# Patient Record
Sex: FEMALE | Race: WHITE | NOT HISPANIC OR LATINO
[De-identification: names, ages, dates, MRNs, and addresses within clinical notes are randomized per-mention and may not be internally consistent; named-entity substitution may affect disease eponyms.]

---

## 2017-01-11 ENCOUNTER — APPOINTMENT (OUTPATIENT)
Dept: GYNECOLOGIC ONCOLOGY | Facility: CLINIC | Age: 72
End: 2017-01-11

## 2017-02-23 ENCOUNTER — APPOINTMENT (OUTPATIENT)
Dept: GYNECOLOGIC ONCOLOGY | Facility: CLINIC | Age: 72
End: 2017-02-23

## 2017-02-23 VITALS
SYSTOLIC BLOOD PRESSURE: 130 MMHG | DIASTOLIC BLOOD PRESSURE: 80 MMHG | BODY MASS INDEX: 23.39 KG/M2 | HEIGHT: 59 IN | WEIGHT: 116 LBS

## 2017-02-23 DIAGNOSIS — Z86.018 PERSONAL HISTORY OF OTHER BENIGN NEOPLASM: ICD-10-CM

## 2017-02-23 DIAGNOSIS — M79.669 PAIN IN UNSPECIFIED LOWER LEG: ICD-10-CM

## 2017-02-23 DIAGNOSIS — N95.0 POSTMENOPAUSAL BLEEDING: ICD-10-CM

## 2017-02-23 DIAGNOSIS — Z87.898 PERSONAL HISTORY OF OTHER SPECIFIED CONDITIONS: ICD-10-CM

## 2017-02-23 DIAGNOSIS — N94.9 UNSPECIFIED CONDITION ASSOCIATED WITH FEMALE GENITAL ORGANS AND MENSTRUAL CYCLE: ICD-10-CM

## 2017-02-24 LAB — CANCER AG125 SERPL-ACNC: 8 U/ML

## 2017-05-24 ENCOUNTER — APPOINTMENT (OUTPATIENT)
Dept: GYNECOLOGIC ONCOLOGY | Facility: CLINIC | Age: 72
End: 2017-05-24

## 2017-05-24 VITALS
SYSTOLIC BLOOD PRESSURE: 140 MMHG | BODY MASS INDEX: 24.19 KG/M2 | WEIGHT: 120 LBS | DIASTOLIC BLOOD PRESSURE: 90 MMHG | HEIGHT: 59 IN

## 2017-05-26 LAB — CANCER AG125 SERPL-ACNC: 9 U/ML

## 2017-05-30 ENCOUNTER — OTHER (OUTPATIENT)
Age: 72
End: 2017-05-30

## 2017-06-28 ENCOUNTER — APPOINTMENT (OUTPATIENT)
Dept: DERMATOLOGY | Facility: CLINIC | Age: 72
End: 2017-06-28

## 2017-08-14 ENCOUNTER — OUTPATIENT (OUTPATIENT)
Dept: OUTPATIENT SERVICES | Facility: HOSPITAL | Age: 72
LOS: 1 days | Discharge: HOME | End: 2017-08-14

## 2017-08-14 DIAGNOSIS — Z96.641 PRESENCE OF RIGHT ARTIFICIAL HIP JOINT: Chronic | ICD-10-CM

## 2017-08-14 DIAGNOSIS — Z90.710 ACQUIRED ABSENCE OF BOTH CERVIX AND UTERUS: Chronic | ICD-10-CM

## 2017-08-14 DIAGNOSIS — S62.109A FRACTURE OF UNSPECIFIED CARPAL BONE, UNSPECIFIED WRIST, INITIAL ENCOUNTER FOR CLOSED FRACTURE: Chronic | ICD-10-CM

## 2017-08-14 DIAGNOSIS — S72.009A FRACTURE OF UNSPECIFIED PART OF NECK OF UNSPECIFIED FEMUR, INITIAL ENCOUNTER FOR CLOSED FRACTURE: Chronic | ICD-10-CM

## 2017-08-14 DIAGNOSIS — Z98.89 OTHER SPECIFIED POSTPROCEDURAL STATES: Chronic | ICD-10-CM

## 2017-08-14 DIAGNOSIS — C54.1 MALIGNANT NEOPLASM OF ENDOMETRIUM: ICD-10-CM

## 2017-08-16 ENCOUNTER — APPOINTMENT (OUTPATIENT)
Dept: HEMATOLOGY ONCOLOGY | Facility: CLINIC | Age: 72
End: 2017-08-16
Payer: MEDICARE

## 2017-08-16 ENCOUNTER — RESULT REVIEW (OUTPATIENT)
Age: 72
End: 2017-08-16

## 2017-08-16 VITALS
BODY MASS INDEX: 23.6 KG/M2 | HEART RATE: 83 BPM | OXYGEN SATURATION: 96 % | TEMPERATURE: 99.2 F | SYSTOLIC BLOOD PRESSURE: 131 MMHG | RESPIRATION RATE: 16 BRPM | DIASTOLIC BLOOD PRESSURE: 75 MMHG | WEIGHT: 116.84 LBS

## 2017-08-16 LAB
HCT VFR BLD CALC: 41.5 % — SIGNIFICANT CHANGE UP (ref 34.5–45)
HGB BLD-MCNC: 13.8 G/DL — SIGNIFICANT CHANGE UP (ref 11.5–15.5)
MCHC RBC-ENTMCNC: 32.2 PG — SIGNIFICANT CHANGE UP (ref 27–34)
MCHC RBC-ENTMCNC: 33.3 G/DL — SIGNIFICANT CHANGE UP (ref 32–36)
MCV RBC AUTO: 96.8 FL — SIGNIFICANT CHANGE UP (ref 80–100)
PLATELET # BLD AUTO: 209 K/UL — SIGNIFICANT CHANGE UP (ref 150–400)
RBC # BLD: 4.29 M/UL — SIGNIFICANT CHANGE UP (ref 3.8–5.2)
RBC # FLD: 13 % — SIGNIFICANT CHANGE UP (ref 10.3–14.5)
WBC # BLD: 6 K/UL — SIGNIFICANT CHANGE UP (ref 3.8–10.5)
WBC # FLD AUTO: 6 K/UL — SIGNIFICANT CHANGE UP (ref 3.8–10.5)

## 2017-08-16 PROCEDURE — 99214 OFFICE O/P EST MOD 30 MIN: CPT

## 2017-08-16 RX ORDER — VALACYCLOVIR 1 G/1
1 TABLET, FILM COATED ORAL DAILY
Refills: 0 | Status: ACTIVE | COMMUNITY
Start: 2017-08-16

## 2017-08-16 RX ORDER — DOXYCYCLINE HYCLATE 100 MG/1
100 CAPSULE ORAL DAILY
Refills: 0 | Status: ACTIVE | COMMUNITY
Start: 2017-08-16

## 2017-08-17 LAB
ALBUMIN SERPL ELPH-MCNC: 4.3 G/DL
ALP BLD-CCNC: 86 U/L
ALT SERPL-CCNC: 44 U/L
ANION GAP SERPL CALC-SCNC: 13 MMOL/L
AST SERPL-CCNC: 39 U/L
BILIRUB SERPL-MCNC: 0.4 MG/DL
BUN SERPL-MCNC: 17 MG/DL
CALCIUM SERPL-MCNC: 9.6 MG/DL
CANCER AG125 SERPL-ACNC: 9 U/ML
CHLORIDE SERPL-SCNC: 95 MMOL/L
CO2 SERPL-SCNC: 30 MMOL/L
CREAT SERPL-MCNC: 0.64 MG/DL
GLUCOSE SERPL-MCNC: 99 MG/DL
POTASSIUM SERPL-SCNC: 4.1 MMOL/L
PROT SERPL-MCNC: 6.9 G/DL
SODIUM SERPL-SCNC: 138 MMOL/L

## 2017-09-14 ENCOUNTER — APPOINTMENT (OUTPATIENT)
Dept: GYNECOLOGIC ONCOLOGY | Facility: CLINIC | Age: 72
End: 2017-09-14

## 2017-09-19 ENCOUNTER — APPOINTMENT (OUTPATIENT)
Dept: GYNECOLOGIC ONCOLOGY | Facility: CLINIC | Age: 72
End: 2017-09-19
Payer: MEDICARE

## 2017-09-19 VITALS
DIASTOLIC BLOOD PRESSURE: 78 MMHG | WEIGHT: 117 LBS | SYSTOLIC BLOOD PRESSURE: 162 MMHG | BODY MASS INDEX: 23.59 KG/M2 | HEIGHT: 59 IN

## 2017-09-19 VITALS — SYSTOLIC BLOOD PRESSURE: 127 MMHG | DIASTOLIC BLOOD PRESSURE: 73 MMHG

## 2017-09-19 DIAGNOSIS — C54.1 MALIGNANT NEOPLASM OF ENDOMETRIUM: ICD-10-CM

## 2017-09-19 PROCEDURE — 99213 OFFICE O/P EST LOW 20 MIN: CPT

## 2017-11-17 ENCOUNTER — APPOINTMENT (OUTPATIENT)
Dept: ORTHOPEDIC SURGERY | Facility: CLINIC | Age: 72
End: 2017-11-17
Payer: MEDICARE

## 2017-11-17 VITALS
WEIGHT: 117 LBS | SYSTOLIC BLOOD PRESSURE: 151 MMHG | HEART RATE: 76 BPM | BODY MASS INDEX: 23.59 KG/M2 | DIASTOLIC BLOOD PRESSURE: 86 MMHG | HEIGHT: 59 IN

## 2017-11-17 DIAGNOSIS — S52.502D UNSPECIFIED FRACTURE OF THE LOWER END OF LEFT RADIUS, SUBSEQUENT ENCOUNTER FOR CLOSED FRACTURE WITH ROUTINE HEALING: ICD-10-CM

## 2017-11-17 DIAGNOSIS — M75.111 INCOMPLETE ROTATOR CUFF TEAR OR RUPTURE OF RIGHT SHOULDER, NOT SPECIFIED AS TRAUMATIC: ICD-10-CM

## 2017-11-17 DIAGNOSIS — S72.002D FRACTURE OF UNSPECIFIED PART OF NECK OF LEFT FEMUR, SUBSEQUENT ENCOUNTER FOR CLOSED FRACTURE WITH ROUTINE HEALING: ICD-10-CM

## 2017-11-17 PROCEDURE — 99213 OFFICE O/P EST LOW 20 MIN: CPT

## 2017-11-17 PROCEDURE — 73502 X-RAY EXAM HIP UNI 2-3 VIEWS: CPT | Mod: LT

## 2018-01-25 ENCOUNTER — INPATIENT (INPATIENT)
Facility: HOSPITAL | Age: 73
LOS: 4 days | Discharge: HOME | DRG: 517 | End: 2018-01-30
Attending: INTERNAL MEDICINE | Admitting: FAMILY MEDICINE
Payer: COMMERCIAL

## 2018-01-25 VITALS
WEIGHT: 117.95 LBS | HEART RATE: 88 BPM | TEMPERATURE: 98 F | DIASTOLIC BLOOD PRESSURE: 92 MMHG | OXYGEN SATURATION: 98 % | HEIGHT: 59 IN | RESPIRATION RATE: 20 BRPM | SYSTOLIC BLOOD PRESSURE: 171 MMHG

## 2018-01-25 DIAGNOSIS — Z98.89 OTHER SPECIFIED POSTPROCEDURAL STATES: Chronic | ICD-10-CM

## 2018-01-25 DIAGNOSIS — B00.9 HERPESVIRAL INFECTION, UNSPECIFIED: ICD-10-CM

## 2018-01-25 DIAGNOSIS — Z90.710 ACQUIRED ABSENCE OF BOTH CERVIX AND UTERUS: Chronic | ICD-10-CM

## 2018-01-25 DIAGNOSIS — Z85.42 PERSONAL HISTORY OF MALIGNANT NEOPLASM OF OTHER PARTS OF UTERUS: ICD-10-CM

## 2018-01-25 DIAGNOSIS — S82.009A UNSPECIFIED FRACTURE OF UNSPECIFIED PATELLA, INITIAL ENCOUNTER FOR CLOSED FRACTURE: ICD-10-CM

## 2018-01-25 DIAGNOSIS — S72.009A FRACTURE OF UNSPECIFIED PART OF NECK OF UNSPECIFIED FEMUR, INITIAL ENCOUNTER FOR CLOSED FRACTURE: Chronic | ICD-10-CM

## 2018-01-25 DIAGNOSIS — Z96.641 PRESENCE OF RIGHT ARTIFICIAL HIP JOINT: Chronic | ICD-10-CM

## 2018-01-25 DIAGNOSIS — I10 ESSENTIAL (PRIMARY) HYPERTENSION: ICD-10-CM

## 2018-01-25 DIAGNOSIS — S62.109A FRACTURE OF UNSPECIFIED CARPAL BONE, UNSPECIFIED WRIST, INITIAL ENCOUNTER FOR CLOSED FRACTURE: Chronic | ICD-10-CM

## 2018-01-25 DIAGNOSIS — Z86.718 PERSONAL HISTORY OF OTHER VENOUS THROMBOSIS AND EMBOLISM: ICD-10-CM

## 2018-01-25 LAB
ALBUMIN SERPL ELPH-MCNC: 4.1 G/DL — SIGNIFICANT CHANGE UP (ref 3.3–5.2)
ALP SERPL-CCNC: 99 U/L — SIGNIFICANT CHANGE UP (ref 40–120)
ALT FLD-CCNC: 58 U/L — HIGH
ANION GAP SERPL CALC-SCNC: 13 MMOL/L — SIGNIFICANT CHANGE UP (ref 5–17)
APTT BLD: 27.7 SEC — SIGNIFICANT CHANGE UP (ref 27.5–37.4)
AST SERPL-CCNC: 39 U/L — HIGH
BASOPHILS # BLD AUTO: 0 K/UL — SIGNIFICANT CHANGE UP (ref 0–0.2)
BASOPHILS NFR BLD AUTO: 0.3 % — SIGNIFICANT CHANGE UP (ref 0–2)
BILIRUB SERPL-MCNC: 0.4 MG/DL — SIGNIFICANT CHANGE UP (ref 0.4–2)
BLD GP AB SCN SERPL QL: SIGNIFICANT CHANGE UP
BUN SERPL-MCNC: 13 MG/DL — SIGNIFICANT CHANGE UP (ref 8–20)
CALCIUM SERPL-MCNC: 9 MG/DL — SIGNIFICANT CHANGE UP (ref 8.6–10.2)
CHLORIDE SERPL-SCNC: 97 MMOL/L — LOW (ref 98–107)
CO2 SERPL-SCNC: 26 MMOL/L — SIGNIFICANT CHANGE UP (ref 22–29)
CREAT SERPL-MCNC: 0.41 MG/DL — LOW (ref 0.5–1.3)
EOSINOPHIL # BLD AUTO: 0.1 K/UL — SIGNIFICANT CHANGE UP (ref 0–0.5)
EOSINOPHIL NFR BLD AUTO: 1 % — SIGNIFICANT CHANGE UP (ref 0–6)
GLUCOSE SERPL-MCNC: 89 MG/DL — SIGNIFICANT CHANGE UP (ref 70–115)
HCT VFR BLD CALC: 40.5 % — SIGNIFICANT CHANGE UP (ref 37–47)
HGB BLD-MCNC: 13.6 G/DL — SIGNIFICANT CHANGE UP (ref 12–16)
INR BLD: 0.93 RATIO — SIGNIFICANT CHANGE UP (ref 0.88–1.16)
LYMPHOCYTES # BLD AUTO: 1.9 K/UL — SIGNIFICANT CHANGE UP (ref 1–4.8)
LYMPHOCYTES # BLD AUTO: 32.6 % — SIGNIFICANT CHANGE UP (ref 20–55)
MCHC RBC-ENTMCNC: 32.2 PG — HIGH (ref 27–31)
MCHC RBC-ENTMCNC: 33.6 G/DL — SIGNIFICANT CHANGE UP (ref 32–36)
MCV RBC AUTO: 95.7 FL — SIGNIFICANT CHANGE UP (ref 81–99)
MONOCYTES # BLD AUTO: 0.7 K/UL — SIGNIFICANT CHANGE UP (ref 0–0.8)
MONOCYTES NFR BLD AUTO: 11.4 % — HIGH (ref 3–10)
NEUTROPHILS # BLD AUTO: 3.2 K/UL — SIGNIFICANT CHANGE UP (ref 1.8–8)
NEUTROPHILS NFR BLD AUTO: 54.7 % — SIGNIFICANT CHANGE UP (ref 37–73)
PLATELET # BLD AUTO: 222 K/UL — SIGNIFICANT CHANGE UP (ref 150–400)
POTASSIUM SERPL-MCNC: 4 MMOL/L — SIGNIFICANT CHANGE UP (ref 3.5–5.3)
POTASSIUM SERPL-SCNC: 4 MMOL/L — SIGNIFICANT CHANGE UP (ref 3.5–5.3)
PROT SERPL-MCNC: 7.1 G/DL — SIGNIFICANT CHANGE UP (ref 6.6–8.7)
PROTHROM AB SERPL-ACNC: 10.2 SEC — SIGNIFICANT CHANGE UP (ref 9.8–12.7)
RBC # BLD: 4.23 M/UL — LOW (ref 4.4–5.2)
RBC # FLD: 12.6 % — SIGNIFICANT CHANGE UP (ref 11–15.6)
SODIUM SERPL-SCNC: 136 MMOL/L — SIGNIFICANT CHANGE UP (ref 135–145)
TROPONIN T SERPL-MCNC: <0.01 NG/ML — SIGNIFICANT CHANGE UP (ref 0–0.06)
TYPE + AB SCN PNL BLD: SIGNIFICANT CHANGE UP
WBC # BLD: 5.9 K/UL — SIGNIFICANT CHANGE UP (ref 4.8–10.8)
WBC # FLD AUTO: 5.9 K/UL — SIGNIFICANT CHANGE UP (ref 4.8–10.8)

## 2018-01-25 PROCEDURE — 73562 X-RAY EXAM OF KNEE 3: CPT | Mod: 26,RT

## 2018-01-25 PROCEDURE — 99222 1ST HOSP IP/OBS MODERATE 55: CPT

## 2018-01-25 PROCEDURE — 99285 EMERGENCY DEPT VISIT HI MDM: CPT

## 2018-01-25 PROCEDURE — 71046 X-RAY EXAM CHEST 2 VIEWS: CPT | Mod: 26

## 2018-01-25 PROCEDURE — 93010 ELECTROCARDIOGRAM REPORT: CPT

## 2018-01-25 PROCEDURE — 99223 1ST HOSP IP/OBS HIGH 75: CPT | Mod: 57

## 2018-01-25 PROCEDURE — 73502 X-RAY EXAM HIP UNI 2-3 VIEWS: CPT | Mod: 26,RT

## 2018-01-25 RX ORDER — SODIUM CHLORIDE 9 MG/ML
3 INJECTION INTRAMUSCULAR; INTRAVENOUS; SUBCUTANEOUS ONCE
Qty: 0 | Refills: 0 | Status: COMPLETED | OUTPATIENT
Start: 2018-01-25 | End: 2018-01-25

## 2018-01-25 RX ORDER — VITAMIN E 100 UNIT
400 CAPSULE ORAL DAILY
Qty: 0 | Refills: 0 | Status: DISCONTINUED | OUTPATIENT
Start: 2018-01-25 | End: 2018-01-26

## 2018-01-25 RX ORDER — LOSARTAN POTASSIUM 100 MG/1
50 TABLET, FILM COATED ORAL DAILY
Qty: 0 | Refills: 0 | Status: DISCONTINUED | OUTPATIENT
Start: 2018-01-25 | End: 2018-01-26

## 2018-01-25 RX ORDER — VALACYCLOVIR 500 MG/1
1000 TABLET, FILM COATED ORAL DAILY
Qty: 0 | Refills: 0 | Status: DISCONTINUED | OUTPATIENT
Start: 2018-01-25 | End: 2018-01-26

## 2018-01-25 RX ORDER — ASCORBIC ACID 60 MG
500 TABLET,CHEWABLE ORAL DAILY
Qty: 0 | Refills: 0 | Status: DISCONTINUED | OUTPATIENT
Start: 2018-01-25 | End: 2018-01-26

## 2018-01-25 RX ORDER — HEPARIN SODIUM 5000 [USP'U]/ML
5000 INJECTION INTRAVENOUS; SUBCUTANEOUS ONCE
Qty: 0 | Refills: 0 | Status: COMPLETED | OUTPATIENT
Start: 2018-01-25 | End: 2018-01-25

## 2018-01-25 RX ORDER — CHOLECALCIFEROL (VITAMIN D3) 125 MCG
1000 CAPSULE ORAL DAILY
Qty: 0 | Refills: 0 | Status: DISCONTINUED | OUTPATIENT
Start: 2018-01-25 | End: 2018-01-26

## 2018-01-25 RX ORDER — ACETAMINOPHEN 500 MG
650 TABLET ORAL ONCE
Qty: 0 | Refills: 0 | Status: COMPLETED | OUTPATIENT
Start: 2018-01-25 | End: 2018-01-25

## 2018-01-25 RX ORDER — OXYCODONE AND ACETAMINOPHEN 5; 325 MG/1; MG/1
1 TABLET ORAL EVERY 6 HOURS
Qty: 0 | Refills: 0 | Status: DISCONTINUED | OUTPATIENT
Start: 2018-01-25 | End: 2018-01-26

## 2018-01-25 RX ADMIN — SODIUM CHLORIDE 3 MILLILITER(S): 9 INJECTION INTRAMUSCULAR; INTRAVENOUS; SUBCUTANEOUS at 19:15

## 2018-01-25 RX ADMIN — HEPARIN SODIUM 5000 UNIT(S): 5000 INJECTION INTRAVENOUS; SUBCUTANEOUS at 22:23

## 2018-01-25 RX ADMIN — Medication 650 MILLIGRAM(S): at 16:30

## 2018-01-25 NOTE — H&P ADULT - PROBLEM SELECTOR PLAN 1
ortho has been consulted, recommendation noted,  DVT prophylaxis.  pain control  Patient will be at average risk for the proposed procedure.

## 2018-01-25 NOTE — H&P ADULT - NEGATIVE CARDIOVASCULAR SYMPTOMS
no orthopnea/no paroxysmal nocturnal dyspnea/no dyspnea on exertion/no peripheral edema/no chest pain/no palpitations

## 2018-01-25 NOTE — H&P ADULT - FAMILY HISTORY
Family history of premature CAD     Family history of hypertension in sister     Sibling  Still living? Unknown  Family history of glaucoma in sister, Age at diagnosis: Age Unknown

## 2018-01-25 NOTE — CONSULT NOTE ADULT - SUBJECTIVE AND OBJECTIVE BOX
Pt Name: DEQUAN OSORIO    MRN: [id number]      Patient is a 72y Female c/o right knee pain x today. Patient states she was at work, when she tripped and fell, impacting her right knee. Patient admits to hx of right JANEEN with Dr. Saleh a few years ago. Also admits to ORIF left wrist 2 years ago. Patient states had a DVT 2 years ago, was on Xarelto but is no longer taking. Also c/o right hip "soreness" since fall. No other orthopedic complaints.    HEALTH ISSUES - PROBLEM Dx:      .      REVIEW OF SYSTEMS      General:	denies fever/chills    Respiratory and Thorax: denies SOB  	  Cardiovascular:	denies CP    Gastrointestinal:	denies abd pain    Musculoskeletal:	 c/o right knee pain    Neurological:	denies numbness/tingling    ROS is otherwise negative.    PAST MEDICAL & SURGICAL HISTORY:  PAST MEDICAL & SURGICAL HISTORY:  Arthritis  Diverticulosis  Uterine cancer  Liver enzyme elevation  Cataracts, bilateral  DVT (deep venous thrombosis), left  Hip fracture: left  Hypertension  S/P hysterectomy  Fracture dislocation of wrist joint: ORIF Left wrist - Plate (12/2015)  S/P tonsillectomy  Hip fracture requiring operative repair: ORIF left Hip - 3 screws (11/2015)  History of hip replacement, total, right      Allergies: epinephrine (Short breath)      Medications: sodium chloride 0.9% lock flush 3 milliLiter(s) IV Push once      FAMILY HISTORY:  Family history of hypertension in sister  Family history of glaucoma in sister  Family history of premature CAD  : non-contributory    Social History:     Ambulation: Walking independently [ X] With Cane [ ] With Walker [ ]  Bedbound [ ]               PHYSICAL EXAM:    Vital Signs Last 24 Hrs  T(C): 36.7 (25 Jan 2018 14:37), Max: 36.7 (25 Jan 2018 14:37)  T(F): 98.1 (25 Jan 2018 14:37), Max: 98.1 (25 Jan 2018 14:37)  HR: 88 (25 Jan 2018 14:37) (88 - 88)  BP: 171/92 (25 Jan 2018 14:37) (171/92 - 171/92)  BP(mean): --  RR: 20 (25 Jan 2018 14:37) (20 - 20)  SpO2: 98% (25 Jan 2018 14:37) (98% - 98%)  Daily Height in cm: 149.86 (25 Jan 2018 14:37)    Daily     Appearance: Alert, responsive, in no acute distress.    Neurological: Sensation is grossly intact to light touch. 5/5 motor function of all extremities. No focal deficits or weaknesses found.    Skin: no rash on visible skin. Skin is clean, dry and intact. No bleeding. No abrasions. No ulcerations.    Vascular: 2+ distal pulses. Cap refill < 2 sec. No signs of venous insuffiency or stasis. No extremity ulcerations. No cyanosis.    Musculoskeletal:         Left Upper Extremity: can move freely without pain       Right Upper Extremity: can move freely without pain       Left Lower Extremity: can move freely without pain       Right Lower Extremity: No TTP right hip. + swelling and ecchymoses noted to right knee. Patient unable to SLR. SILT. + dorsi/plantarflexion. DP 2+. Calf soft NT B/L.    Imaging Studies:    xray right knee: + patella fx    A/P:  Pt is a  72y Female with Patient is a right patella fx    PLAN:  D/W Dr. Cuevas  * NPO after midnight  * plan for OR tomorrow pending medical clearance  * KI placed  * WBAT in KI  * admit to medicine

## 2018-01-25 NOTE — ED STATDOCS - MUSCULOSKELETAL, MLM
pain with straightening of the knee but is able to straighten the knee range of motion is not limited and there is no muscle tenderness.

## 2018-01-25 NOTE — ED STATDOCS - PMH
Arthritis    Cataracts, bilateral    Diverticulosis    DVT (deep venous thrombosis), left    Hip fracture  left  Hypertension    Liver enzyme elevation    Uterine cancer

## 2018-01-25 NOTE — H&P ADULT - ASSESSMENT
72 years old female with PMH of HTN, DVT, uterine cancer and L eye herpes retinitis presented to the ER with R knee pain s/p fall a work. Pain is constant, 7/10, non radiating and associated with swelling of the knee. 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 The child started running, she tripped and fell impacting her right knee. No Loc. 72 years old female with PMH of HTN, DVT, uterine cancer and herpes infection of L eye presented to the ER with R knee pain s/p fall a work. Pain is constant, 7/10, non radiating and associated with swelling of the knee. 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 The child started running, she tripped and fell impacting her right knee. No Loc.

## 2018-01-25 NOTE — ED ADULT NURSE NOTE - OBJECTIVE STATEMENT
Pt axox3  c/o right knee pain and now is unable to bear weight on affected extremity. Pt states she was walking when she tripped while walking a child landing on her right knee.  No obvious signs of trauma noted, site is tender to touch.

## 2018-01-25 NOTE — ED ADULT NURSE REASSESSMENT NOTE - COMFORT CARE
ambulated to bathroom/plan of care explained/warm blanket provided/wait time explained/meal provided/repositioned/po fluids offered

## 2018-01-25 NOTE — H&P ADULT - PROBLEM SELECTOR PLAN 2
controlled   on Irbesartan 150 mg -->cozaar 25 mg daily  monitor BP. controlled   on Irbesartan 150 mg -->cozaar 50 mg daily  monitor BP.

## 2018-01-25 NOTE — H&P ADULT - RS GEN PE MLT RESP DETAILS PC
no rhonchi/respirations non-labored/no rales/breath sounds equal/no chest wall tenderness/good air movement

## 2018-01-25 NOTE — ED ADULT TRIAGE NOTE - WEIGHT IN KG
Patient fractured her left Tib Fib in April and had ORIF done. Since then she has been working at home with PT, walking with a cane now. Therapist and home health nurse are concerned about the persistent pain and skin discoloration and inflammation noted around left ankle. Patient feels like there is something inside her leg rubbing and causing pain, pain is worse when she walks.   Currently when seated no pain. She has not been using ice, so I recommended she use ice for inflammation. Will check xray.    53.5

## 2018-01-25 NOTE — ED STATDOCS - OBJECTIVE STATEMENT
71 y/o F pt with hx of HTN, hip fracture and DVT presents to ED c/o R knee pain s/p trip and fall. 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 Pt is having difficulty ambulating. No further complaints at this time.

## 2018-01-25 NOTE — H&P ADULT - HISTORY OF PRESENT ILLNESS
72 years old female with PMH of HTN, DVT, uterine cancer and L eye herpes retinitis presented to the ER with R knee pain s/p fall a work. Pain is constant, 7/10, non radiating and associated with swelling of the knee. 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 The child started running, she tripped and fell impacting her right knee. No Loc. 72 years old female with PMH of HTN, DVT, uterine cancer and herpes infection of the L eye  presented to the ER with R knee pain s/p fall a work. Pain is constant, 7/10, non radiating and associated with swelling of the knee. 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 The child started running, she tripped and fell impacting her right knee. No Loc.

## 2018-01-25 NOTE — ED ADULT NURSE REASSESSMENT NOTE - NS ED NURSE REASSESS COMMENT FT1
PT axox3 comfortable at this time, offered and refused pain medications. Pt is to be admitted for Right  knee patella FX. Ortho at bedside speaking to patient and updated on plan of care. PT is to go to the OR in the AM ( time not known), pt agrees with plan and understands she is to be NPO at midnight. Report given to HR RN that is resuming care. Pt settled into B8R, admitting hospitalist at bedside speaking to patient

## 2018-01-25 NOTE — ED STATDOCS - PSH
Fracture dislocation of wrist joint  ORIF Left wrist - Plate (12/2015)  Hip fracture requiring operative repair  ORIF left Hip - 3 screws (11/2015)  History of hip replacement, total, right    S/P hysterectomy    S/P tonsillectomy

## 2018-01-26 ENCOUNTER — TRANSCRIPTION ENCOUNTER (OUTPATIENT)
Age: 73
End: 2018-01-26

## 2018-01-26 PROCEDURE — 99233 SBSQ HOSP IP/OBS HIGH 50: CPT

## 2018-01-26 PROCEDURE — 27524 TREAT KNEECAP FRACTURE: CPT | Mod: AS,RT

## 2018-01-26 PROCEDURE — 27524 TREAT KNEECAP FRACTURE: CPT | Mod: RT

## 2018-01-26 PROCEDURE — 76001: CPT | Mod: 26

## 2018-01-26 RX ORDER — INFLUENZA VIRUS VACCINE 15; 15; 15; 15 UG/.5ML; UG/.5ML; UG/.5ML; UG/.5ML
0.5 SUSPENSION INTRAMUSCULAR ONCE
Qty: 0 | Refills: 0 | Status: COMPLETED | OUTPATIENT
Start: 2018-01-26 | End: 2018-01-26

## 2018-01-26 RX ORDER — CEFAZOLIN SODIUM 1 G
2000 VIAL (EA) INJECTION
Qty: 0 | Refills: 0 | Status: COMPLETED | OUTPATIENT
Start: 2018-01-26 | End: 2018-01-27

## 2018-01-26 RX ORDER — CEFAZOLIN SODIUM 1 G
2000 VIAL (EA) INJECTION ONCE
Qty: 0 | Refills: 0 | Status: DISCONTINUED | OUTPATIENT
Start: 2018-01-26 | End: 2018-01-26

## 2018-01-26 RX ORDER — ONDANSETRON 8 MG/1
4 TABLET, FILM COATED ORAL ONCE
Qty: 0 | Refills: 0 | Status: DISCONTINUED | OUTPATIENT
Start: 2018-01-26 | End: 2018-01-26

## 2018-01-26 RX ORDER — OXYCODONE HYDROCHLORIDE 5 MG/1
5 TABLET ORAL
Qty: 0 | Refills: 0 | Status: DISCONTINUED | OUTPATIENT
Start: 2018-01-26 | End: 2018-01-30

## 2018-01-26 RX ORDER — ONDANSETRON 8 MG/1
4 TABLET, FILM COATED ORAL EVERY 6 HOURS
Qty: 0 | Refills: 0 | Status: DISCONTINUED | OUTPATIENT
Start: 2018-01-26 | End: 2018-01-30

## 2018-01-26 RX ORDER — ACETAMINOPHEN 500 MG
1000 TABLET ORAL ONCE
Qty: 0 | Refills: 0 | Status: DISCONTINUED | OUTPATIENT
Start: 2018-01-26 | End: 2018-01-30

## 2018-01-26 RX ORDER — OXYCODONE HYDROCHLORIDE 5 MG/1
10 TABLET ORAL EVERY 6 HOURS
Qty: 0 | Refills: 0 | Status: DISCONTINUED | OUTPATIENT
Start: 2018-01-26 | End: 2018-01-30

## 2018-01-26 RX ORDER — ACETAMINOPHEN 500 MG
975 TABLET ORAL EVERY 8 HOURS
Qty: 0 | Refills: 0 | Status: DISCONTINUED | OUTPATIENT
Start: 2018-01-26 | End: 2018-01-27

## 2018-01-26 RX ORDER — ASCORBIC ACID 60 MG
500 TABLET,CHEWABLE ORAL DAILY
Qty: 0 | Refills: 0 | Status: DISCONTINUED | OUTPATIENT
Start: 2018-01-26 | End: 2018-01-30

## 2018-01-26 RX ORDER — ENOXAPARIN SODIUM 100 MG/ML
40 INJECTION SUBCUTANEOUS DAILY
Qty: 0 | Refills: 0 | Status: DISCONTINUED | OUTPATIENT
Start: 2018-01-26 | End: 2018-01-30

## 2018-01-26 RX ORDER — VALACYCLOVIR 500 MG/1
1000 TABLET, FILM COATED ORAL DAILY
Qty: 0 | Refills: 0 | Status: DISCONTINUED | OUTPATIENT
Start: 2018-01-26 | End: 2018-01-30

## 2018-01-26 RX ORDER — LOSARTAN POTASSIUM 100 MG/1
50 TABLET, FILM COATED ORAL DAILY
Qty: 0 | Refills: 0 | Status: DISCONTINUED | OUTPATIENT
Start: 2018-01-26 | End: 2018-01-30

## 2018-01-26 RX ORDER — SODIUM CHLORIDE 9 MG/ML
1000 INJECTION, SOLUTION INTRAVENOUS
Qty: 0 | Refills: 0 | Status: DISCONTINUED | OUTPATIENT
Start: 2018-01-26 | End: 2018-01-26

## 2018-01-26 RX ORDER — LOSARTAN POTASSIUM 100 MG/1
50 TABLET, FILM COATED ORAL DAILY
Qty: 0 | Refills: 0 | Status: DISCONTINUED | OUTPATIENT
Start: 2018-01-26 | End: 2018-01-26

## 2018-01-26 RX ORDER — FENTANYL CITRATE 50 UG/ML
50 INJECTION INTRAVENOUS
Qty: 0 | Refills: 0 | Status: DISCONTINUED | OUTPATIENT
Start: 2018-01-26 | End: 2018-01-26

## 2018-01-26 RX ORDER — VITAMIN E 100 UNIT
400 CAPSULE ORAL DAILY
Qty: 0 | Refills: 0 | Status: DISCONTINUED | OUTPATIENT
Start: 2018-01-26 | End: 2018-01-30

## 2018-01-26 RX ORDER — KETOROLAC TROMETHAMINE 30 MG/ML
15 SYRINGE (ML) INJECTION EVERY 6 HOURS
Qty: 0 | Refills: 0 | Status: DISCONTINUED | OUTPATIENT
Start: 2018-01-26 | End: 2018-01-26

## 2018-01-26 RX ORDER — CHOLECALCIFEROL (VITAMIN D3) 125 MCG
1000 CAPSULE ORAL DAILY
Qty: 0 | Refills: 0 | Status: DISCONTINUED | OUTPATIENT
Start: 2018-01-26 | End: 2018-01-30

## 2018-01-26 RX ORDER — HYDROMORPHONE HYDROCHLORIDE 2 MG/ML
0.5 INJECTION INTRAMUSCULAR; INTRAVENOUS; SUBCUTANEOUS
Qty: 0 | Refills: 0 | Status: DISCONTINUED | OUTPATIENT
Start: 2018-01-26 | End: 2018-01-26

## 2018-01-26 RX ADMIN — Medication 15 MILLIGRAM(S): at 16:51

## 2018-01-26 RX ADMIN — FENTANYL CITRATE 50 MICROGRAM(S): 50 INJECTION INTRAVENOUS at 10:06

## 2018-01-26 RX ADMIN — ENOXAPARIN SODIUM 40 MILLIGRAM(S): 100 INJECTION SUBCUTANEOUS at 12:34

## 2018-01-26 RX ADMIN — Medication 975 MILLIGRAM(S): at 21:07

## 2018-01-26 RX ADMIN — Medication 15 MILLIGRAM(S): at 12:34

## 2018-01-26 RX ADMIN — Medication 1 TABLET(S): at 12:37

## 2018-01-26 RX ADMIN — Medication 975 MILLIGRAM(S): at 23:30

## 2018-01-26 RX ADMIN — Medication 15 MILLIGRAM(S): at 17:15

## 2018-01-26 RX ADMIN — Medication 100 MILLIGRAM(S): at 07:38

## 2018-01-26 RX ADMIN — Medication 975 MILLIGRAM(S): at 12:35

## 2018-01-26 RX ADMIN — Medication 975 MILLIGRAM(S): at 13:15

## 2018-01-26 RX ADMIN — FENTANYL CITRATE 50 MICROGRAM(S): 50 INJECTION INTRAVENOUS at 10:37

## 2018-01-26 RX ADMIN — Medication 400 INTERNATIONAL UNIT(S): at 16:50

## 2018-01-26 RX ADMIN — VALACYCLOVIR 1000 MILLIGRAM(S): 500 TABLET, FILM COATED ORAL at 16:50

## 2018-01-26 RX ADMIN — Medication 100 MILLIGRAM(S): at 18:00

## 2018-01-26 RX ADMIN — Medication 500 MILLIGRAM(S): at 12:34

## 2018-01-26 RX ADMIN — LOSARTAN POTASSIUM 50 MILLIGRAM(S): 100 TABLET, FILM COATED ORAL at 05:19

## 2018-01-26 RX ADMIN — FENTANYL CITRATE 50 MICROGRAM(S): 50 INJECTION INTRAVENOUS at 10:12

## 2018-01-26 RX ADMIN — Medication 15 MILLIGRAM(S): at 12:50

## 2018-01-26 RX ADMIN — Medication 50 MILLIGRAM(S): at 05:19

## 2018-01-26 RX ADMIN — Medication 1000 UNIT(S): at 16:50

## 2018-01-26 NOTE — DISCHARGE NOTE ADULT - MEDICATION SUMMARY - MEDICATIONS TO TAKE
I will START or STAY ON the medications listed below when I get home from the hospital:    vitamin D  -- 2400 international unit(s) by mouth once a day in am  -- Indication: For Home medication    calcium 1200mg & Magnesium 1200mg  -- 1 cap(s) by mouth 2 times a day  -- Indication: For Home medication    Rolling walker   -- ICD 10- S82.009A    -- Indication: For Patella fracture    acetaminophen 325 mg oral tablet  -- 2 tab(s) by mouth every 6 hours, As needed, fever  -- Indication: For Patella fracture    aspirin 325 mg oral delayed release tablet  -- 1 tab(s) by mouth 2 times a day   -- Swallow whole.  Do not crush.  Take with food or milk.    -- Indication: For Patella fracture - dvt prophylaxis for 6 week per ortho     irbesartan 150 mg oral tablet  -- 1 tab(s) by mouth once a day in am  -- Indication: For Htn    valACYclovir 1 g oral tablet  -- 1 tab(s) by mouth once a day  -- Indication: For Herpes infection    Probiotic Formula oral capsule  -- 1 cap(s) by mouth once a day in am  -- Indication: For Home medication    Protonix 40 mg oral delayed release tablet  -- 1 tab(s) by mouth once a day   -- It is very important that you take or use this exactly as directed.  Do not skip doses or discontinue unless directed by your doctor.  Obtain medical advice before taking any non-prescription drugs as some may affect the action of this medication.  Swallow whole.  Do not crush.    -- Indication: For Prophylaxis    multivitamin  -- 1 tab(s) by mouth once a day in am last dose 2/26/16  -- Indication: For Home medication    Vitamin C 500 mg oral capsule  -- 1 cap(s) by mouth once a day in am  -- Indication: For Home medication    vitamin E 400 intl units oral capsule  -- 1 cap(s) by mouth once a day in am last dose 2/26/16  -- Indication: For Home medication I will START or STAY ON the medications listed below when I get home from the hospital:    vitamin D  -- 2400 international unit(s) by mouth once a day in am  -- Indication: For supplement    calcium 1200mg & Magnesium 1200mg  -- 1 cap(s) by mouth 2 times a day  -- Indication: For supplement    Rolling walker   -- ICD 10- S82.009A    -- Indication: For Closed fracture of patella    acetaminophen 325 mg oral tablet  -- 2 tab(s) by mouth every 6 hours, As needed, fever  -- Indication: For Pain    aspirin 325 mg oral delayed release tablet  -- 1 tab(s) by mouth 2 times a day   -- Swallow whole.  Do not crush.  Take with food or milk.    -- Indication: For History of DVT (deep vein thrombosis)    irbesartan 150 mg oral tablet  -- 1 tab(s) by mouth once a day in am  -- Indication: For HTN    valACYclovir 1 g oral tablet  -- 1 tab(s) by mouth once a day  -- Indication: For Herpes infection    Probiotic Formula oral capsule  -- 1 cap(s) by mouth once a day in am  -- Indication: For Probiotic    Protonix 40 mg oral delayed release tablet  -- 1 tab(s) by mouth once a day   -- It is very important that you take or use this exactly as directed.  Do not skip doses or discontinue unless directed by your doctor.  Obtain medical advice before taking any non-prescription drugs as some may affect the action of this medication.  Swallow whole.  Do not crush.    -- Indication: For GI prophylaxis    multivitamin  -- 1 tab(s) by mouth once a day in am last dose 2/26/16  -- Indication: For supplement    Vitamin C 500 mg oral capsule  -- 1 cap(s) by mouth once a day in am  -- Indication: For supplement    vitamin E 400 intl units oral capsule  -- 1 cap(s) by mouth once a day in am last dose 2/26/16  -- Indication: For supplement

## 2018-01-26 NOTE — PROGRESS NOTE ADULT - SUBJECTIVE AND OBJECTIVE BOX
Ortho Post Op Check    Name: DEQUAN OSORIO    MR #: [id number]    Procedure: Right Patella ORIF  Surgeon:  Faith    Pt comfortable without complaints, pain controlled  Denies CP, SOB, N/V, numbness/tingling     General Exam:  Vital Signs Last 24 Hrs  T(C): 37.5 (01-26-18 @ 16:34), Max: 37.5 (01-26-18 @ 16:34)  T(F): 99.5 (01-26-18 @ 16:34), Max: 99.5 (01-26-18 @ 16:34)  HR: 91 (01-26-18 @ 16:34) (91 - 91)  BP: 137/79 (01-26-18 @ 16:34) (137/79 - 137/79)  BP(mean): --  RR: 18 (01-26-18 @ 16:34) (18 - 18)  SpO2: --    General: Pt Alert and oriented, NAD, controlled pain.  Dressings C/D/I. No bleeding.  Pulses: 2+ dorsalis pedis pulse. Cap refill < 2 sec.  Sensation: Grossly intact to light touch without deficit.  Motor: + EHL/FHL/TA/GS  Knee immobilizer in place                        13.6   5.9   )-----------( 222      ( 25 Jan 2018 18:53 )             40.5   25 Jan 2018 18:53    136    |  97     |  13.0   ----------------------------<  89     4.0     |  26.0   |  0.41       TPro  7.1    /  Alb  4.1    /  TBili  0.4    /  DBili  x      /  AST  39     /  ALT  58     /  AlkPhos  99     25 Jan 2018 18:53      Post-op X-Ray:    A/P: 72yFemale POD#0 s/p Right patellar ORIF  - Stable  - Pain Control  - DVT ppx: as per medicine  - Post op abx:  - PT eval pending  - Weight bearing status: TTWB RLE in knee immobilizer

## 2018-01-26 NOTE — DISCHARGE NOTE ADULT - PROVIDER TOKENS
AWA:'[id number]' TOKEN:'[id number]',FREE:[LAST:[primary care],PHONE:[(   )    -],FAX:[(   )    -]]

## 2018-01-26 NOTE — DISCHARGE NOTE ADULT - ADDITIONAL INSTRUCTIONS
The patient will be seen in the office between 2-3 weeks for  wound check. Tape will be removed at that time. Patient may NOT shower until after re-evaluation in the office. The patient will continue with knee immobilizer when ambulating as applied in hospital and may only remove while in bed until re-evaluation in the office. No flexion of the knee. The patient will contact the office if the wound becomes red, has increasing pain, develops bleeding or discharge, an injury occurs, or has other concerns. The patient will begin ASPIRIN 325 BID for DVTP. The patient will take Oxycodone and Tylenol for pain control and titrate according to prescription and patient needs. The patient is toe touch-weight bearing in knee immobilizer on the right lower extremity. The patient is recommended to elevated the affected extremity to reduce swelling.

## 2018-01-26 NOTE — PROGRESS NOTE ADULT - ASSESSMENT
72 years old female with PMH of HTN, DVT, uterine cancer and herpes infection of L eye presented to the ER with R knee pain s/p fall a work, xray Knee joint Acute comminuted fracture of the patella, seen by orthopedic, s/p Patella fracture surgery - comminuted inferior pole right patella fracture 01/26/2018

## 2018-01-26 NOTE — BRIEF OPERATIVE NOTE - PRE-OP DX
Closed displaced comminuted fracture of right patella, initial encounter  01/26/2018    Active  Gray Cuevas

## 2018-01-26 NOTE — PROGRESS NOTE ADULT - SUBJECTIVE AND OBJECTIVE BOX
Internal Medicine Hospitalist - Dr. Claudy OSORIO    [id number]    72y      Female    Patient is a 72y old  Female who presents with a chief complaint of R knee pain (26 Jan 2018 10:40)    INTERVAL HPI/ OVERNIGHT EVENTS: Patient is seen and examined, pt is s/p OR today, denied chest pain, SOB.     REVIEW OF SYSTEMS:    Denied fever, chills, abd. pain, nausea, vomiting, chest pain, SOB, headache, dizziness    PHYSICAL EXAM:    Vital Signs Last 24 Hrs  T(C): 37 (26 Jan 2018 12:27), Max: 37 (26 Jan 2018 05:21)  T(F): 98.6 (26 Jan 2018 12:27), Max: 98.6 (26 Jan 2018 05:21)  HR: 80 (26 Jan 2018 12:27) (74 - 98)  BP: 155/84 (26 Jan 2018 12:27) (132/65 - 171/92)  BP(mean): 103 (25 Jan 2018 22:05) (103 - 103)  RR: 18 (26 Jan 2018 12:27) (13 - 20)  SpO2: 98% (26 Jan 2018 12:27) (96% - 100%)    GENERAL: NAD  HEENT: EOMI  Neck: supple  CHEST/LUNG: CTA b/l   HEART: S1S2+ audible  ABDOMEN: Soft, Nontender, Nondistended; Bowel sounds present  EXTREMITIES:  RLE dressing intact   CNS: AAO X 3  Psychiatry: normal mood    LABS:                        13.6   5.9   )-----------( 222      ( 25 Jan 2018 18:53 )             40.5     01-25    136  |  97<L>  |  13.0  ----------------------------<  89  4.0   |  26.0  |  0.41<L>    Ca    9.0      25 Jan 2018 18:53    TPro  7.1  /  Alb  4.1  /  TBili  0.4  /  DBili  x   /  AST  39<H>  /  ALT  58<H>  /  AlkPhos  99  01-25    PT/INR - ( 25 Jan 2018 18:53 )   PT: 10.2 sec;   INR: 0.93 ratio         PTT - ( 25 Jan 2018 18:53 )  PTT:27.7 sec        MEDICATIONS  (STANDING):  acetaminophen   Tablet. 975 milliGRAM(s) Oral every 8 hours  acetaminophen  IVPB. 1000 milliGRAM(s) IV Intermittent once  ascorbic acid 500 milliGRAM(s) Oral daily  ceFAZolin   IVPB 2000 milliGRAM(s) IV Intermittent <User Schedule>  cholecalciferol 1000 Unit(s) Oral daily  enoxaparin Injectable 40 milliGRAM(s) SubCutaneous daily  ketorolac   Injectable 15 milliGRAM(s) IV Push every 6 hours  losartan 50 milliGRAM(s) Oral daily  multivitamin 1 Tablet(s) Oral daily  valACYclovir 1000 milliGRAM(s) Oral daily  vitamin E 400 International Unit(s) Oral daily    MEDICATIONS  (PRN):  ondansetron Injectable 4 milliGRAM(s) IV Push every 6 hours PRN Nausea and/or Vomiting  oxyCODONE    IR 5 milliGRAM(s) Oral every 3 hours PRN Mild Pain (1 - 3)  oxyCODONE    IR 10 milliGRAM(s) Oral every 6 hours PRN Severe Pain (7 - 10)      RADIOLOGY & ADDITIONAL TEST    < from: Xray Knee 3 Views, Right (01.25.18 @ 16:34) >  Impression:  Acute comminuted fracture of the patella..    < end of copied text >

## 2018-01-26 NOTE — DISCHARGE NOTE ADULT - CARE PROVIDER_API CALL
Gray Cuevas), Orthopaedic Surgery  [address] [place] [address]  Phone: [phone]  Fax: [phone] Gray Cuevas), Orthopaedic Surgery  [address] [place] [address]  Phone: [phone]  Fax: [phone]    primary care,   Phone: (   )    -  Fax: (   )    -

## 2018-01-26 NOTE — BRIEF OPERATIVE NOTE - PROCEDURE
<<-----Click on this checkbox to enter Procedure Patella fracture surgery  01/26/2018  comminuted inferior pole right patella fracture  Active  MLINN

## 2018-01-26 NOTE — DISCHARGE NOTE ADULT - HOSPITAL COURSE
72 years old female with PMH of HTN, DVT, uterine cancer and herpes infection of L eye presented to the ER with R knee pain s/p fall a work, xray Knee joint Acute comminuted fracture of the patella, seen by orthopedic, s/p Patella fracture surgery - comminuted inferior pole right patella fracture 01/26/2018. Pt has h/o DVT, doppler LE No evidence of bilateral lower extremity deep venous thrombosis. Pt spike low garde fever 100.5 on 01/27/2018, no focal symptoms, afebrile now, urine c/s negative, blood c/s negative so far. Pt report pain is minimal, denied fever, chills, chest pain, SOB.      Problem/Plan - 1:  ·  Problem: Patella fracture.  Plan: appreciate ortho input, s/p Patella fracture surgery  Per Ortho - DC home on  BID x 6 weeks   - Weight bearing status: TTWB RLE in knee immobilizer  - follow up with Dr. Cuevas.  Pt eval appreciated,. home with home care   doppler LE negative for DVT.      Problem/Plan - 2:  ·  Problem: R/O Fever.  Plan: low grade fever on 01/27/2018, afebrile for almost 48 hours, no focal symptoms, likely postoperative   urine c/w negative and blood c/s negative so far      Problem/Plan - 3:  ·  Problem: Hypertension.  Plan: c/w cozar 50mg, monitor BP     Problem/Plan - 4:  ·  Problem: Herpes infection.  Plan: Continue valtrex.     ICU Vital Signs Last 24 Hrs  T(C): 36.7 (29 Jan 2018 04:25), Max: 37.6 (28 Jan 2018 16:02)  T(F): 98 (29 Jan 2018 04:25), Max: 99.7 (28 Jan 2018 16:02)  HR: 100 (29 Jan 2018 04:25) (100 - 102)  BP: 160/86 (29 Jan 2018 04:25) (148/74 - 160/86)  RR: 18 (29 Jan 2018 04:25) (18 - 18)  SpO2: 97% (29 Jan 2018 04:25) (96% - 97%)    PHYSICAL EXAM:    GENERAL: NAD  CHEST/LUNG: Clear to percussion bilaterally; No rales, rhonchi, wheezing, or rubs  HEART: s1/s2 audible   ABDOMEN: Soft, Nontender, Nondistended; Bowel sounds present  EXTREMITIES: right Knee immobilizer in place            Time spent 40 minutes 72 years old female with PMH of HTN, DVT, uterine cancer and herpes infection of L eye presented to the ER with R knee pain s/p fall a work, xray Knee joint Acute comminuted fracture of the patella, seen by orthopedic, s/p Patella fracture surgery - comminuted inferior pole right patella fracture 01/26/2018. Pt has h/o DVT, doppler LE No evidence of bilateral lower extremity deep venous thrombosis. Pt spike low garde fever 100.5 on 01/27/2018, no focal symptoms, afebrile now, urine c/s negative, blood c/s negative so far. Pt report pain is minimal, denied fever, chills, chest pain, SOB. pt was discharged yesterday, in afternoon noted to have epistaxis, resolved with pressure, had another episode in evening again - resolved, discharge was held, no episode overnight, feeling better     Problem/Plan - 1:  ·  Problem: Patella fracture.  Plan: appreciate ortho input, s/p Patella fracture surgery  Per Ortho - DC home on  BID x 6 weeks   - Weight bearing status: TTWB RLE in knee immobilizer  - follow up with Dr. Cuevas.  Pt eval appreciated,. home with home care   doppler LE negative for DVT.      Problem/Plan - 2:  ·  Problem: R/O Fever.  Plan: low grade fever on 01/27/2018, afebrile for almost 48 hours, no focal symptoms, likely postoperative   urine c/s negative and blood c/s negative so far     Epistaxis.  Plan: resolved now, room temp is warm and dry - resolved.   if reoccur, f/u ENT     PHYSICAL EXAM:    GENERAL: NAD  CHEST/LUNG: Clear to percussion bilaterally; No rales, rhonchi, wheezing, or rubs  HEART: s1/s2 audible   ABDOMEN: Soft, Nontender, Nondistended; Bowel sounds present  EXTREMITIES: right Knee immobilizer in place            Time spent 40 minutes

## 2018-01-26 NOTE — DISCHARGE NOTE ADULT - PLAN OF CARE
s/p Right patellar ORIF Per Ortho - DC home on  BID x 6 weeks   - Weight bearing status: TTWB RLE in knee immobilizer  - follow up with Dr. Cuevas c/w home medication repeat doppler negative for DVT resolved, if reoccur f/u ENT

## 2018-01-26 NOTE — BRIEF OPERATIVE NOTE - COMMENTS
post-op plan: ttwb in immobilizer RLE, elevate RLE, PT/OT, ancef per SCIP,  LMWH while in house, ASA for discharge, f/u ortho 2-3 weeks to start rehab protocol

## 2018-01-26 NOTE — DISCHARGE NOTE ADULT - CARE PLAN
Principal Discharge DX:	Patella fracture  Goal:	s/p Right patellar ORIF  Assessment and plan of treatment:	Per Ortho - DC home on  BID x 6 weeks   - Weight bearing status: TTWB RLE in knee immobilizer  - follow up with Dr. Cuevas  Secondary Diagnosis:	Hypertension  Assessment and plan of treatment:	c/w home medication  Secondary Diagnosis:	History of uterine cancer  Secondary Diagnosis:	History of DVT (deep vein thrombosis)  Assessment and plan of treatment:	repeat doppler negative for DVT Principal Discharge DX:	Patella fracture  Goal:	s/p Right patellar ORIF  Assessment and plan of treatment:	Per Ortho - DC home on  BID x 6 weeks   - Weight bearing status: TTWB RLE in knee immobilizer  - follow up with Dr. Cuevas  Secondary Diagnosis:	Hypertension  Assessment and plan of treatment:	c/w home medication  Secondary Diagnosis:	History of uterine cancer  Secondary Diagnosis:	History of DVT (deep vein thrombosis)  Assessment and plan of treatment:	repeat doppler negative for DVT  Secondary Diagnosis:	Epistaxis  Assessment and plan of treatment:	resolved, if reoccur f/u ENT

## 2018-01-27 LAB
ANION GAP SERPL CALC-SCNC: 13 MMOL/L — SIGNIFICANT CHANGE UP (ref 5–17)
APPEARANCE UR: CLEAR — SIGNIFICANT CHANGE UP
BILIRUB UR-MCNC: NEGATIVE — SIGNIFICANT CHANGE UP
BUN SERPL-MCNC: 7 MG/DL — LOW (ref 8–20)
CALCIUM SERPL-MCNC: 8.6 MG/DL — SIGNIFICANT CHANGE UP (ref 8.6–10.2)
CHLORIDE SERPL-SCNC: 99 MMOL/L — SIGNIFICANT CHANGE UP (ref 98–107)
CO2 SERPL-SCNC: 26 MMOL/L — SIGNIFICANT CHANGE UP (ref 22–29)
COLOR SPEC: YELLOW — SIGNIFICANT CHANGE UP
CREAT SERPL-MCNC: 0.46 MG/DL — LOW (ref 0.5–1.3)
DIFF PNL FLD: ABNORMAL
EPI CELLS # UR: SIGNIFICANT CHANGE UP
GLUCOSE SERPL-MCNC: 122 MG/DL — HIGH (ref 70–115)
GLUCOSE UR QL: NEGATIVE MG/DL — SIGNIFICANT CHANGE UP
HCT VFR BLD CALC: 40.1 % — SIGNIFICANT CHANGE UP (ref 37–47)
HGB BLD-MCNC: 13 G/DL — SIGNIFICANT CHANGE UP (ref 12–16)
KETONES UR-MCNC: NEGATIVE — SIGNIFICANT CHANGE UP
LEUKOCYTE ESTERASE UR-ACNC: ABNORMAL
MCHC RBC-ENTMCNC: 31.4 PG — HIGH (ref 27–31)
MCHC RBC-ENTMCNC: 32.4 G/DL — SIGNIFICANT CHANGE UP (ref 32–36)
MCV RBC AUTO: 96.9 FL — SIGNIFICANT CHANGE UP (ref 81–99)
NITRITE UR-MCNC: NEGATIVE — SIGNIFICANT CHANGE UP
PH UR: 7 — SIGNIFICANT CHANGE UP (ref 5–8)
PLATELET # BLD AUTO: 199 K/UL — SIGNIFICANT CHANGE UP (ref 150–400)
POTASSIUM SERPL-MCNC: 3.8 MMOL/L — SIGNIFICANT CHANGE UP (ref 3.5–5.3)
POTASSIUM SERPL-SCNC: 3.8 MMOL/L — SIGNIFICANT CHANGE UP (ref 3.5–5.3)
PROT UR-MCNC: NEGATIVE MG/DL — SIGNIFICANT CHANGE UP
RBC # BLD: 4.14 M/UL — LOW (ref 4.4–5.2)
RBC # FLD: 12.8 % — SIGNIFICANT CHANGE UP (ref 11–15.6)
RBC CASTS # UR COMP ASSIST: SIGNIFICANT CHANGE UP /HPF (ref 0–4)
SODIUM SERPL-SCNC: 138 MMOL/L — SIGNIFICANT CHANGE UP (ref 135–145)
SP GR SPEC: 1 — LOW (ref 1.01–1.02)
UROBILINOGEN FLD QL: NEGATIVE MG/DL — SIGNIFICANT CHANGE UP
WBC # BLD: 12.5 K/UL — HIGH (ref 4.8–10.8)
WBC # FLD AUTO: 12.5 K/UL — HIGH (ref 4.8–10.8)
WBC UR QL: SIGNIFICANT CHANGE UP

## 2018-01-27 PROCEDURE — 93970 EXTREMITY STUDY: CPT | Mod: 26

## 2018-01-27 PROCEDURE — 99233 SBSQ HOSP IP/OBS HIGH 50: CPT

## 2018-01-27 RX ORDER — KETOROLAC TROMETHAMINE 30 MG/ML
15 SYRINGE (ML) INJECTION EVERY 6 HOURS
Qty: 0 | Refills: 0 | Status: DISCONTINUED | OUTPATIENT
Start: 2018-01-27 | End: 2018-01-29

## 2018-01-27 RX ORDER — ACETAMINOPHEN 500 MG
650 TABLET ORAL EVERY 6 HOURS
Qty: 0 | Refills: 0 | Status: DISCONTINUED | OUTPATIENT
Start: 2018-01-27 | End: 2018-01-30

## 2018-01-27 RX ORDER — HYDRALAZINE HCL 50 MG
25 TABLET ORAL EVERY 6 HOURS
Qty: 0 | Refills: 0 | Status: DISCONTINUED | OUTPATIENT
Start: 2018-01-27 | End: 2018-01-30

## 2018-01-27 RX ADMIN — VALACYCLOVIR 1000 MILLIGRAM(S): 500 TABLET, FILM COATED ORAL at 13:59

## 2018-01-27 RX ADMIN — Medication 25 MILLIGRAM(S): at 11:58

## 2018-01-27 RX ADMIN — Medication 1 TABLET(S): at 14:01

## 2018-01-27 RX ADMIN — Medication 1000 UNIT(S): at 13:59

## 2018-01-27 RX ADMIN — Medication 975 MILLIGRAM(S): at 13:58

## 2018-01-27 RX ADMIN — Medication 400 INTERNATIONAL UNIT(S): at 13:59

## 2018-01-27 RX ADMIN — Medication 650 MILLIGRAM(S): at 18:05

## 2018-01-27 RX ADMIN — Medication 500 MILLIGRAM(S): at 12:10

## 2018-01-27 RX ADMIN — ENOXAPARIN SODIUM 40 MILLIGRAM(S): 100 INJECTION SUBCUTANEOUS at 12:10

## 2018-01-27 RX ADMIN — LOSARTAN POTASSIUM 50 MILLIGRAM(S): 100 TABLET, FILM COATED ORAL at 06:10

## 2018-01-27 RX ADMIN — Medication 100 MILLIGRAM(S): at 00:00

## 2018-01-27 RX ADMIN — Medication 975 MILLIGRAM(S): at 06:09

## 2018-01-27 RX ADMIN — Medication 975 MILLIGRAM(S): at 15:39

## 2018-01-27 NOTE — PHYSICAL THERAPY INITIAL EVALUATION ADULT - CRITERIA FOR SKILLED THERAPEUTIC INTERVENTIONS
impairments found/anticipated discharge recommendation/rehab potential/predicted duration of therapy intervention/functional limitations in following categories/therapy frequency

## 2018-01-27 NOTE — PHYSICAL THERAPY INITIAL EVALUATION ADULT - PERTINENT HX OF CURRENT PROBLEM, REHAB EVAL
pt presents to [facility] due to s/p fall at work, right patella fx s/p ORIF 1/26 to right patella

## 2018-01-27 NOTE — PROGRESS NOTE ADULT - SUBJECTIVE AND OBJECTIVE BOX
Ortho Progress note    Name: DEQUAN OSORIO    MR #: [id number]    Procedure: Right Patella ORIF  Surgeon:  Faith    Pt comfortable without complaints, pain controlled  Denies CP, SOB, N/V, numbness/tingling     General Exam:  Vital Signs Last 24 Hrs  T(C): 37.1 (27 Jan 2018 08:12), Max: 37.5 (26 Jan 2018 16:34)  T(F): 98.7 (27 Jan 2018 08:12), Max: 99.5 (26 Jan 2018 16:34)  HR: 88 (27 Jan 2018 14:26) (77 - 91)  BP: 156/80 (27 Jan 2018 14:26) (127/76 - 174/79)  BP(mean): --  RR: 18 (27 Jan 2018 08:12) (18 - 19)  SpO2: 98% (27 Jan 2018 08:12) (97% - 98%)    General: Pt Alert and oriented, NAD, controlled pain.  Dressing C/D/I. No bleeding.  Pulses: 2+ dorsalis pedis pulse. Cap refill < 2 sec.  Sensation: Grossly intact to light touch without deficit.  Motor: + EHL/FHL/TA/GS  Knee immobilizer in place                                13.0   12.5  )-----------( 199      ( 27 Jan 2018 08:59 )             40.1   01-27    138  |  99  |  7.0<L>  ----------------------------<  122<H>  3.8   |  26.0  |  0.46<L>    Ca    8.6      27 Jan 2018 08:59     EXAM:  US DPLX LWR EXT VEINS COMPL BI                          PROCEDURE DATE:  01/27/2018      INTERPRETATION:  CLINICAL INFORMATION: Right calf pain    COMPARISON: None available.    TECHNIQUE: Duplex sonography of the BILATERAL LOWER extremities with   color and spectral Doppler, with and without compression.      FINDINGS:    There is normal compressibility of the bilateral common femoral, femoral   and popliteal veins. No calf vein thrombosis is detected.    Doppler examination shows normal spontaneous and phasic flow.    IMPRESSION:     No evidence of bilateral lower extremity deep venous thrombosis.    A/P: 72yFemale POD#1 s/p Right patellar ORIF    - Pain Control as clinically indicated  - DVT ppx: Lovenox while inpatient, DC home on  BID   - Continue PT   - Weight bearing status: TTWB RLE in knee immobilizer  - Continue care per primary team  - Ortho stable for DC

## 2018-01-27 NOTE — PHYSICAL THERAPY INITIAL EVALUATION ADULT - GAIT PATTERN USED, PT EVAL
hop to pattern, decreased activity tolerance and gait velocity, incidental standing rest breaks due to fatigue

## 2018-01-27 NOTE — PROGRESS NOTE ADULT - SUBJECTIVE AND OBJECTIVE BOX
72y Female s/p   ORIF R Patella, post-op day 1    T(C): 37.6 (01-27-18 @ 18:06), Max: 38.3 (01-27-18 @ 17:24)  HR: 93 (01-27-18 @ 16:50) (77 - 94)  BP: 136/74 (01-27-18 @ 16:50) (127/76 - 174/79)  RR: 18 (01-27-18 @ 16:50) (18 - 19)  SpO2: 96% (01-27-18 @ 15:24) (96% - 98%)  Wt(kg): --    Pt seen, doing well, no anesthesia complications or complaints noted or reported.   No Nausea  Pain well controlled

## 2018-01-27 NOTE — PROGRESS NOTE ADULT - SUBJECTIVE AND OBJECTIVE BOX
Internal Medicine Hospitalist - Dr. Claudy OSORIO    [id number]    72y      Female    Patient is a 72y old  Female who presents with a chief complaint of R knee pain (26 Jan 2018 10:40)    INTERVAL HPI/ OVERNIGHT EVENTS: Patient is seen and examined, pt report moderate pain in right leg, denied chest pain, SOB.     REVIEW OF SYSTEMS:    Denied fever, chills, abd. pain, nausea, vomiting, chest pain, SOB, headache, dizziness    PHYSICAL EXAM:    Vital Signs Last 24 Hrs  T(C): 37.1 (27 Jan 2018 08:12), Max: 37.5 (26 Jan 2018 16:34)  T(F): 98.7 (27 Jan 2018 08:12), Max: 99.5 (26 Jan 2018 16:34)  HR: 90 (27 Jan 2018 08:12) (76 - 91)  BP: 174/79 (27 Jan 2018 08:12) (127/76 - 174/79)  BP(mean): --  RR: 18 (27 Jan 2018 08:12) (13 - 20)  SpO2: 98% (27 Jan 2018 08:12) (96% - 100%)    GENERAL: NAD  HEENT: EOMI  Neck: supple  CHEST/LUNG: CTA b/l   HEART: S1S2+ audible  ABDOMEN: Soft, Nontender, Nondistended; Bowel sounds present  EXTREMITIES:  RLE - Knee immobilizer in place  CNS: AAO X 3  Psychiatry: normal mood    LABS:                        13.6   5.9   )-----------( 222      ( 25 Jan 2018 18:53 )             40.5     01-25    136  |  97<L>  |  13.0  ----------------------------<  89  4.0   |  26.0  |  0.41<L>    Ca    9.0      25 Jan 2018 18:53    TPro  7.1  /  Alb  4.1  /  TBili  0.4  /  DBili  x   /  AST  39<H>  /  ALT  58<H>  /  AlkPhos  99  01-25    PT/INR - ( 25 Jan 2018 18:53 )   PT: 10.2 sec;   INR: 0.93 ratio         PTT - ( 25 Jan 2018 18:53 )  PTT:27.7 sec        MEDICATIONS  (STANDING):  acetaminophen   Tablet. 975 milliGRAM(s) Oral every 8 hours  acetaminophen  IVPB. 1000 milliGRAM(s) IV Intermittent once  ascorbic acid 500 milliGRAM(s) Oral daily  cholecalciferol 1000 Unit(s) Oral daily  enoxaparin Injectable 40 milliGRAM(s) SubCutaneous daily  losartan 50 milliGRAM(s) Oral daily  multivitamin 1 Tablet(s) Oral daily  valACYclovir 1000 milliGRAM(s) Oral daily  vitamin E 400 International Unit(s) Oral daily    MEDICATIONS  (PRN):  hydrALAZINE 25 milliGRAM(s) Oral every 6 hours PRN Systolic blood pressure > 150  ketorolac   Injectable 15 milliGRAM(s) IV Push every 6 hours PRN Severe Pain (7 - 10)  ondansetron Injectable 4 milliGRAM(s) IV Push every 6 hours PRN Nausea and/or Vomiting  oxyCODONE    IR 5 milliGRAM(s) Oral every 3 hours PRN Mild Pain (1 - 3)  oxyCODONE    IR 10 milliGRAM(s) Oral every 6 hours PRN Severe Pain (7 - 10)      RADIOLOGY & ADDITIONAL TEST

## 2018-01-27 NOTE — PHYSICAL THERAPY INITIAL EVALUATION ADULT - ADDITIONAL COMMENTS
pt owns a W/C, 3 steps 1 rail to enter home pt owns a W/C, 3 steps 1 rail to enter home,pt owns commode and shower chair

## 2018-01-27 NOTE — PROGRESS NOTE ADULT - PROBLEM SELECTOR PLAN 1
appreciate ortho input, s/p Patella fracture surgery  pain control  Lovenox for DVt PPX  Pt per ortho  Pt report pain in right leg -Pt has h/o Left leg DVT after hip surgery - will get doppler LE

## 2018-01-28 LAB
CULTURE RESULTS: NO GROWTH — SIGNIFICANT CHANGE UP
SPECIMEN SOURCE: SIGNIFICANT CHANGE UP

## 2018-01-28 PROCEDURE — 99233 SBSQ HOSP IP/OBS HIGH 50: CPT

## 2018-01-28 RX ADMIN — Medication 1000 UNIT(S): at 13:50

## 2018-01-28 RX ADMIN — Medication 400 INTERNATIONAL UNIT(S): at 13:50

## 2018-01-28 RX ADMIN — Medication 25 MILLIGRAM(S): at 21:10

## 2018-01-28 RX ADMIN — Medication 1 TABLET(S): at 12:23

## 2018-01-28 RX ADMIN — VALACYCLOVIR 1000 MILLIGRAM(S): 500 TABLET, FILM COATED ORAL at 13:50

## 2018-01-28 RX ADMIN — LOSARTAN POTASSIUM 50 MILLIGRAM(S): 100 TABLET, FILM COATED ORAL at 06:14

## 2018-01-28 RX ADMIN — Medication 500 MILLIGRAM(S): at 12:22

## 2018-01-28 RX ADMIN — ENOXAPARIN SODIUM 40 MILLIGRAM(S): 100 INJECTION SUBCUTANEOUS at 12:22

## 2018-01-28 NOTE — PROGRESS NOTE ADULT - SUBJECTIVE AND OBJECTIVE BOX
Internal Medicine Hospitalist - Dr. Claudy OSORIO    [id number]    72y      Female    Patient is a 72y old  Female who presents with a chief complaint of R knee pain (2018 10:40)    INTERVAL HPI/ OVERNIGHT EVENTS: Patient is seen and examined, pt report knee pain is better today, spike low grade fever yesterday, denied cough, abd. pain, N/V/D and urinary complaints     REVIEW OF SYSTEMS:    Denied fever, chills, abd. pain, nausea, vomiting, chest pain, SOB, headache, dizziness    PHYSICAL EXAM:    Vital Signs Last 24 Hrs  T(C): 37.3 (2018 06:10), Max: 38.3 (2018 17:24)  T(F): 99.1 (2018 06:10), Max: 100.9 (2018 17:24)  HR: 94 (2018 06:10) (88 - 94)  BP: 153/82 (2018 06:10) (136/74 - 171/83)  BP(mean): --  RR: 18 (2018 06:10) (18 - 18)  SpO2: 96% (2018 15:24) (96% - 96%)    GENERAL: NAD  HEENT: EOMI  Neck: supple  CHEST/LUNG: CTA b/l   HEART: S1S2+ audible  ABDOMEN: Soft, Nontender, Nondistended; Bowel sounds present  EXTREMITIES:  right Knee immobilizer in place          CNS: AAO X 3  Psychiatry: normal mood    LABS:                        13.0   12.5  )-----------( 199      ( 2018 08:59 )             40.1         138  |  99  |  7.0<L>  ----------------------------<  122<H>  3.8   |  26.0  |  0.46<L>    Ca    8.6      2018 08:59        Urinalysis Basic - ( 2018 22:18 )    Color: Yellow / Appearance: Clear / S.005 / pH: x  Gluc: x / Ketone: Negative  / Bili: Negative / Urobili: Negative mg/dL   Blood: x / Protein: Negative mg/dL / Nitrite: Negative   Leuk Esterase: Trace / RBC: 0-2 /HPF / WBC 0-2   Sq Epi: x / Non Sq Epi: Few / Bacteria: x          MEDICATIONS  (STANDING):  acetaminophen  IVPB. 1000 milliGRAM(s) IV Intermittent once  ascorbic acid 500 milliGRAM(s) Oral daily  cholecalciferol 1000 Unit(s) Oral daily  enoxaparin Injectable 40 milliGRAM(s) SubCutaneous daily  losartan 50 milliGRAM(s) Oral daily  multivitamin 1 Tablet(s) Oral daily  valACYclovir 1000 milliGRAM(s) Oral daily  vitamin E 400 International Unit(s) Oral daily    MEDICATIONS  (PRN):  acetaminophen   Tablet 650 milliGRAM(s) Oral every 6 hours PRN fever  hydrALAZINE 25 milliGRAM(s) Oral every 6 hours PRN Systolic blood pressure > 150  ketorolac   Injectable 15 milliGRAM(s) IV Push every 6 hours PRN Severe Pain (7 - 10)  ondansetron Injectable 4 milliGRAM(s) IV Push every 6 hours PRN Nausea and/or Vomiting  oxyCODONE    IR 5 milliGRAM(s) Oral every 3 hours PRN Mild Pain (1 - 3)  oxyCODONE    IR 10 milliGRAM(s) Oral every 6 hours PRN Severe Pain (7 - 10)      RADIOLOGY & ADDITIONAL TEST    < from: US Duplex Venous Lower Ext Complete, Bilateral (18 @ 10:54) >    IMPRESSION:     No evidence of bilateral lower extremity deep venous thrombosis.    < end of copied text >

## 2018-01-28 NOTE — PROGRESS NOTE ADULT - ASSESSMENT
72 years old female with PMH of HTN, DVT, uterine cancer and herpes infection of L eye presented to the ER with R knee pain s/p fall a work, xray Knee joint Acute comminuted fracture of the patella, seen by orthopedic, s/p Patella fracture surgery - comminuted inferior pole right patella fracture 01/26/2018. Pt has h/o DVT, doppler LE No evidence of bilateral lower extremity deep venous thrombosis. Pt spike low garde fever 100.5 on 01/27/2018, no focal symptoms, f/u urine and blood c/s.

## 2018-01-28 NOTE — PROGRESS NOTE ADULT - SUBJECTIVE AND OBJECTIVE BOX
Ortho Progress note    Name: DEQUAN OSORIO    MR #: [id number]    Procedure: Right Patella ORIF  Surgeon: Dr. Cuevas    Pt comfortable without complaints, pain controlled  Denies CP, SOB, N/V, numbness/tingling     General Exam:  Vital Signs Last 24 Hrs  T(C): 37.3 (28 Jan 2018 06:10), Max: 38.3 (27 Jan 2018 17:24)  T(F): 99.1 (28 Jan 2018 06:10), Max: 100.9 (27 Jan 2018 17:24)  HR: 94 (28 Jan 2018 06:10) (88 - 94)  BP: 153/82 (28 Jan 2018 06:10) (136/74 - 171/83)  BP(mean): --  RR: 18 (28 Jan 2018 06:10) (18 - 18)  SpO2: 96% (27 Jan 2018 15:24) (96% - 96%)    General: Pt Alert and oriented, NAD, controlled pain.  Knee immobilizer in place- removed  Dressing C/D/I- removed  Incision healing well, no drainage or erythema noted  calf soft/ compressible, NTTP  New dressing/ ACE bandage applied  Knee immobilizer re-applied  Pulses: 2+ dorsalis pedis pulse. Cap refill < 2 sec.  Sensation: Grossly intact to light touch without deficit.  Motor: + EHL/FHL/TA/GS          EXAM:  US DPLX LWR EXT VEINS COMPL BI                        PROCEDURE DATE:  01/27/2018    FINDINGS:  There is normal compressibility of the bilateral common femoral, femoral   and popliteal veins. No calf vein thrombosis is detected.  Doppler examination shows normal spontaneous and phasic flow.  IMPRESSION:   No evidence of bilateral lower extremity deep venous thrombosis.    A/P: 72yFemale POD#2 s/p Right patellar ORIF    - Pain Control as clinically indicated  - DVT ppx: Lovenox while inpatient, DC home on  BID x 6 weeks   - Continue PT   - Weight bearing status: TTWB RLE in knee immobilizer  - Continue care per primary team  - Ortho stable for DC and follow up with Dr. Cuevas

## 2018-01-28 NOTE — PROGRESS NOTE ADULT - PROBLEM SELECTOR PLAN 2
low grade fever on 01/27/2018, afebrile today, no focal symptoms, likely postoperative   f/u urine and blood c/s

## 2018-01-28 NOTE — PROGRESS NOTE ADULT - PROBLEM SELECTOR PLAN 1
appreciate ortho input, s/p Patella fracture surgery  pain control  Lovenox for DVt PPX  Pt eval  doppler LE negative for DVT

## 2018-01-29 LAB
HCT VFR BLD CALC: 38.1 % — SIGNIFICANT CHANGE UP (ref 37–47)
HGB BLD-MCNC: 12.6 G/DL — SIGNIFICANT CHANGE UP (ref 12–16)
MCHC RBC-ENTMCNC: 31.5 PG — HIGH (ref 27–31)
MCHC RBC-ENTMCNC: 33.1 G/DL — SIGNIFICANT CHANGE UP (ref 32–36)
MCV RBC AUTO: 95.3 FL — SIGNIFICANT CHANGE UP (ref 81–99)
PLATELET # BLD AUTO: 195 K/UL — SIGNIFICANT CHANGE UP (ref 150–400)
RBC # BLD: 4 M/UL — LOW (ref 4.4–5.2)
RBC # FLD: 12.6 % — SIGNIFICANT CHANGE UP (ref 11–15.6)
WBC # BLD: 9.3 K/UL — SIGNIFICANT CHANGE UP (ref 4.8–10.8)
WBC # FLD AUTO: 9.3 K/UL — SIGNIFICANT CHANGE UP (ref 4.8–10.8)

## 2018-01-29 PROCEDURE — 99232 SBSQ HOSP IP/OBS MODERATE 35: CPT

## 2018-01-29 RX ORDER — ACETAMINOPHEN 500 MG
2 TABLET ORAL
Qty: 0 | Refills: 0 | COMMUNITY
Start: 2018-01-29

## 2018-01-29 RX ORDER — ASPIRIN/CALCIUM CARB/MAGNESIUM 324 MG
1 TABLET ORAL
Qty: 84 | Refills: 0 | OUTPATIENT
Start: 2018-01-29 | End: 2018-03-11

## 2018-01-29 RX ORDER — VALACYCLOVIR 500 MG/1
1 TABLET, FILM COATED ORAL
Qty: 0 | Refills: 0 | COMMUNITY
Start: 2018-01-29

## 2018-01-29 RX ORDER — PANTOPRAZOLE SODIUM 20 MG/1
1 TABLET, DELAYED RELEASE ORAL
Qty: 30 | Refills: 0 | OUTPATIENT
Start: 2018-01-29 | End: 2018-02-27

## 2018-01-29 RX ADMIN — Medication 1000 UNIT(S): at 12:53

## 2018-01-29 RX ADMIN — Medication 1 TABLET(S): at 12:53

## 2018-01-29 RX ADMIN — Medication 500 MILLIGRAM(S): at 12:53

## 2018-01-29 RX ADMIN — LOSARTAN POTASSIUM 50 MILLIGRAM(S): 100 TABLET, FILM COATED ORAL at 05:27

## 2018-01-29 RX ADMIN — ENOXAPARIN SODIUM 40 MILLIGRAM(S): 100 INJECTION SUBCUTANEOUS at 12:54

## 2018-01-29 RX ADMIN — VALACYCLOVIR 1000 MILLIGRAM(S): 500 TABLET, FILM COATED ORAL at 12:53

## 2018-01-29 RX ADMIN — Medication 400 INTERNATIONAL UNIT(S): at 12:53

## 2018-01-29 NOTE — PROGRESS NOTE ADULT - ASSESSMENT
72 years old female with PMH of HTN, DVT, uterine cancer and herpes infection of L eye presented to the ER with R knee pain s/p fall a work, xray Knee joint Acute comminuted fracture of the patella, seen by orthopedic, s/p Patella fracture surgery - comminuted inferior pole right patella fracture 01/26/2018. Pt has h/o DVT, doppler LE No evidence of bilateral lower extremity deep venous thrombosis. Pt spike low garde fever 100.5 on 01/27/2018, no focal symptoms, f/u urine and blood c/s negative so far. pt was discharged today, in afternoon noted to have epistaxis, resolved with pressure, had another episode in evening again, discharge cancelled to observe overnight

## 2018-01-29 NOTE — PROGRESS NOTE ADULT - PROBLEM SELECTOR PLAN 2
low grade fever on 01/27/2018, afebrile today, no focal symptoms, likely postoperative   f/u urine and blood c/s - negative so far

## 2018-01-29 NOTE — PROGRESS NOTE ADULT - PROBLEM SELECTOR PLAN 1
appreciate ortho input, s/p Patella fracture surgery  pain control  Lovenox for DVt PPX  Pt eval - home with home PT   doppler LE negative for DVT

## 2018-01-29 NOTE — PROGRESS NOTE ADULT - SUBJECTIVE AND OBJECTIVE BOX
Internal Medicine Hospitalist - Dr. Claudy OSORIO    [id number]    72y      Female    Patient is a 72y old  Female who presents with a chief complaint of R knee pain (2018 10:40)          INTERVAL HPI/ OVERNIGHT EVENTS: Patient is seen and examined, pt was discharged today, in afternoon noted to have epistaxis, resolved with pressure, had another episode in evening again.     REVIEW OF SYSTEMS:    Denied fever, chills, abd. pain, nausea, vomiting, chest pain, SOB, headache, dizziness    PHYSICAL EXAM:    Vital Signs Last 24 Hrs  T(C): 37.4 (2018 16:06), Max: 37.4 (2018 16:06)  T(F): 99.4 (2018 16:06), Max: 99.4 (2018 16:06)  HR: 106 (2018 16:06) (100 - 106)  BP: 125/73 (2018 16:06) (121/79 - 160/86)  BP(mean): --  RR: 19 (2018 09:50) (18 - 19)  SpO2: 98% (2018 09:50) (96% - 98%)    GENERAL: NAD  HEENT: EOMI  Neck: supple  CHEST/LUNG: CTA b/l   HEART: S1S2+ audible  ABDOMEN: Soft, Nontender, Nondistended; Bowel sounds present  EXTREMITIES: knee immobilizer   CNS: AAO X 3  Psychiatry: normal mood    LABS:                        12.6   9.3   )-----------( 195      ( 2018 08:08 )             38.1             Urinalysis Basic - ( 2018 22:18 )    Color: Yellow / Appearance: Clear / S.005 / pH: x  Gluc: x / Ketone: Negative  / Bili: Negative / Urobili: Negative mg/dL   Blood: x / Protein: Negative mg/dL / Nitrite: Negative   Leuk Esterase: Trace / RBC: 0-2 /HPF / WBC 0-2   Sq Epi: x / Non Sq Epi: Few / Bacteria: x          MEDICATIONS  (STANDING):  acetaminophen  IVPB. 1000 milliGRAM(s) IV Intermittent once  ascorbic acid 500 milliGRAM(s) Oral daily  cholecalciferol 1000 Unit(s) Oral daily  enoxaparin Injectable 40 milliGRAM(s) SubCutaneous daily  losartan 50 milliGRAM(s) Oral daily  multivitamin 1 Tablet(s) Oral daily  valACYclovir 1000 milliGRAM(s) Oral daily  vitamin E 400 International Unit(s) Oral daily    MEDICATIONS  (PRN):  acetaminophen   Tablet 650 milliGRAM(s) Oral every 6 hours PRN fever  hydrALAZINE 25 milliGRAM(s) Oral every 6 hours PRN Systolic blood pressure > 150  ketorolac   Injectable 15 milliGRAM(s) IV Push every 6 hours PRN Severe Pain (7 - 10)  ondansetron Injectable 4 milliGRAM(s) IV Push every 6 hours PRN Nausea and/or Vomiting  oxyCODONE    IR 5 milliGRAM(s) Oral every 3 hours PRN Mild Pain (1 - 3)  oxyCODONE    IR 10 milliGRAM(s) Oral every 6 hours PRN Severe Pain (7 - 10)      RADIOLOGY & ADDITIONAL TEST

## 2018-01-30 ENCOUNTER — APPOINTMENT (OUTPATIENT)
Dept: GYNECOLOGIC ONCOLOGY | Facility: CLINIC | Age: 73
End: 2018-01-30

## 2018-01-30 VITALS
SYSTOLIC BLOOD PRESSURE: 121 MMHG | TEMPERATURE: 98 F | HEART RATE: 86 BPM | OXYGEN SATURATION: 98 % | DIASTOLIC BLOOD PRESSURE: 75 MMHG | RESPIRATION RATE: 18 BRPM

## 2018-01-30 PROCEDURE — 86901 BLOOD TYPING SEROLOGIC RH(D): CPT

## 2018-01-30 PROCEDURE — 99285 EMERGENCY DEPT VISIT HI MDM: CPT | Mod: 25

## 2018-01-30 PROCEDURE — 81001 URINALYSIS AUTO W/SCOPE: CPT

## 2018-01-30 PROCEDURE — 80048 BASIC METABOLIC PNL TOTAL CA: CPT

## 2018-01-30 PROCEDURE — 73502 X-RAY EXAM HIP UNI 2-3 VIEWS: CPT

## 2018-01-30 PROCEDURE — 73562 X-RAY EXAM OF KNEE 3: CPT

## 2018-01-30 PROCEDURE — 97163 PT EVAL HIGH COMPLEX 45 MIN: CPT

## 2018-01-30 PROCEDURE — 97116 GAIT TRAINING THERAPY: CPT

## 2018-01-30 PROCEDURE — 85027 COMPLETE CBC AUTOMATED: CPT

## 2018-01-30 PROCEDURE — 87086 URINE CULTURE/COLONY COUNT: CPT

## 2018-01-30 PROCEDURE — 76000 FLUOROSCOPY <1 HR PHYS/QHP: CPT

## 2018-01-30 PROCEDURE — 86850 RBC ANTIBODY SCREEN: CPT

## 2018-01-30 PROCEDURE — 85730 THROMBOPLASTIN TIME PARTIAL: CPT

## 2018-01-30 PROCEDURE — 87040 BLOOD CULTURE FOR BACTERIA: CPT

## 2018-01-30 PROCEDURE — 99239 HOSP IP/OBS DSCHRG MGMT >30: CPT

## 2018-01-30 PROCEDURE — 84484 ASSAY OF TROPONIN QUANT: CPT

## 2018-01-30 PROCEDURE — 97530 THERAPEUTIC ACTIVITIES: CPT

## 2018-01-30 PROCEDURE — 36415 COLL VENOUS BLD VENIPUNCTURE: CPT

## 2018-01-30 PROCEDURE — 93005 ELECTROCARDIOGRAM TRACING: CPT

## 2018-01-30 PROCEDURE — 71046 X-RAY EXAM CHEST 2 VIEWS: CPT

## 2018-01-30 PROCEDURE — 93970 EXTREMITY STUDY: CPT

## 2018-01-30 PROCEDURE — 80053 COMPREHEN METABOLIC PANEL: CPT

## 2018-01-30 PROCEDURE — 86900 BLOOD TYPING SEROLOGIC ABO: CPT

## 2018-01-30 PROCEDURE — 85610 PROTHROMBIN TIME: CPT

## 2018-01-30 RX ORDER — ASPIRIN/CALCIUM CARB/MAGNESIUM 324 MG
325 TABLET ORAL
Qty: 0 | Refills: 0 | Status: DISCONTINUED | OUTPATIENT
Start: 2018-01-30 | End: 2018-01-30

## 2018-01-30 RX ADMIN — Medication 1000 UNIT(S): at 12:55

## 2018-01-30 RX ADMIN — VALACYCLOVIR 1000 MILLIGRAM(S): 500 TABLET, FILM COATED ORAL at 12:56

## 2018-01-30 RX ADMIN — Medication 400 INTERNATIONAL UNIT(S): at 12:55

## 2018-01-30 RX ADMIN — Medication 325 MILLIGRAM(S): at 12:55

## 2018-01-30 RX ADMIN — Medication 1 TABLET(S): at 12:55

## 2018-01-30 RX ADMIN — Medication 500 MILLIGRAM(S): at 12:55

## 2018-01-30 RX ADMIN — LOSARTAN POTASSIUM 50 MILLIGRAM(S): 100 TABLET, FILM COATED ORAL at 05:14

## 2018-01-30 NOTE — PROGRESS NOTE ADULT - PROBLEM SELECTOR PLAN 1
appreciate ortho input, s/p Patella fracture surgery  pain control  Aspirin   Pt eval - home with home PT   dopple bid r LE negative for DVT

## 2018-01-30 NOTE — PROGRESS NOTE ADULT - SUBJECTIVE AND OBJECTIVE BOX
Internal Medicine Hospitalist - Dr. Claudy OSORIO    [id number]    72y      Female    Patient is a 72y old  Female who presents with a chief complaint of R knee pain (26 Jan 2018 10:40)    INTERVAL HPI/ OVERNIGHT EVENTS: Patient is seen and examined, had couple of episode of epistaxis yesterday, resolved no episode last night and today, denied chest pain, SOB, cough, dizziness.     REVIEW OF SYSTEMS:    Denied fever, chills, abd. pain, nausea, vomiting, chest pain, SOB, headache, dizziness    PHYSICAL EXAM:    Vital Signs Last 24 Hrs  T(C): 36.5 (30 Jan 2018 04:15), Max: 37.4 (29 Jan 2018 16:06)  T(F): 97.7 (30 Jan 2018 04:15), Max: 99.4 (29 Jan 2018 16:06)  HR: 86 (30 Jan 2018 04:15) (86 - 106)  BP: 121/75 (30 Jan 2018 04:15) (117/72 - 125/73)  BP(mean): --  RR: 18 (30 Jan 2018 04:15) (18 - 19)  SpO2: 98% (30 Jan 2018 04:15) (98% - 98%)    GENERAL: NAD  HEENT: EOMI  Neck: supple  CHEST/LUNG: CTA b/l   HEART: S1S2+ audible  ABDOMEN: Soft, Nontender, Nondistended; Bowel sounds present  EXTREMITIES:  no edema  CNS: AAO X 3  Psychiatry: normal mood    LABS:                        12.6   9.3   )-----------( 195      ( 29 Jan 2018 08:08 )             38.1           MEDICATIONS  (STANDING):  acetaminophen  IVPB. 1000 milliGRAM(s) IV Intermittent once  ascorbic acid 500 milliGRAM(s) Oral daily  aspirin 325 milliGRAM(s) Oral two times a day  cholecalciferol 1000 Unit(s) Oral daily  losartan 50 milliGRAM(s) Oral daily  multivitamin 1 Tablet(s) Oral daily  valACYclovir 1000 milliGRAM(s) Oral daily  vitamin E 400 International Unit(s) Oral daily    MEDICATIONS  (PRN):  acetaminophen   Tablet 650 milliGRAM(s) Oral every 6 hours PRN fever  hydrALAZINE 25 milliGRAM(s) Oral every 6 hours PRN Systolic blood pressure > 150  ondansetron Injectable 4 milliGRAM(s) IV Push every 6 hours PRN Nausea and/or Vomiting  oxyCODONE    IR 5 milliGRAM(s) Oral every 3 hours PRN Mild Pain (1 - 3)  oxyCODONE    IR 10 milliGRAM(s) Oral every 6 hours PRN Severe Pain (7 - 10)      RADIOLOGY & ADDITIONAL TEST

## 2018-01-30 NOTE — PROGRESS NOTE ADULT - ASSESSMENT
72 years old female with PMH of HTN, DVT, uterine cancer and herpes infection of L eye presented to the ER with R knee pain s/p fall a work, xray Knee joint Acute comminuted fracture of the patella, seen by orthopedic, s/p Patella fracture surgery - comminuted inferior pole right patella fracture 01/26/2018. Pt has h/o DVT, doppler LE No evidence of bilateral lower extremity deep venous thrombosis. Pt spike low garde fever 100.5 on 01/27/2018, no focal symptoms, f/u urine and blood c/s negative so far. pt was discharged today, in afternoon noted to have epistaxis, resolved with pressure, had another episode in evening again - resolved, feeling better.

## 2018-01-31 ENCOUNTER — OTHER (OUTPATIENT)
Age: 73
End: 2018-01-31

## 2018-02-01 ENCOUNTER — OTHER (OUTPATIENT)
Age: 73
End: 2018-02-01

## 2018-02-01 LAB
CULTURE RESULTS: SIGNIFICANT CHANGE UP
CULTURE RESULTS: SIGNIFICANT CHANGE UP
SPECIMEN SOURCE: SIGNIFICANT CHANGE UP
SPECIMEN SOURCE: SIGNIFICANT CHANGE UP

## 2018-02-07 ENCOUNTER — FORM ENCOUNTER (OUTPATIENT)
Age: 73
End: 2018-02-07

## 2018-02-08 ENCOUNTER — OTHER (OUTPATIENT)
Age: 73
End: 2018-02-08

## 2018-02-08 ENCOUNTER — APPOINTMENT (OUTPATIENT)
Dept: ORTHOPEDIC SURGERY | Facility: CLINIC | Age: 73
End: 2018-02-08
Payer: MEDICARE

## 2018-02-08 ENCOUNTER — OUTPATIENT (OUTPATIENT)
Dept: OUTPATIENT SERVICES | Facility: HOSPITAL | Age: 73
LOS: 1 days | End: 2018-02-08
Payer: COMMERCIAL

## 2018-02-08 VITALS
BODY MASS INDEX: 23.59 KG/M2 | TEMPERATURE: 98.4 F | SYSTOLIC BLOOD PRESSURE: 147 MMHG | HEIGHT: 59 IN | HEART RATE: 91 BPM | WEIGHT: 117 LBS | DIASTOLIC BLOOD PRESSURE: 80 MMHG

## 2018-02-08 DIAGNOSIS — S62.109A FRACTURE OF UNSPECIFIED CARPAL BONE, UNSPECIFIED WRIST, INITIAL ENCOUNTER FOR CLOSED FRACTURE: Chronic | ICD-10-CM

## 2018-02-08 DIAGNOSIS — S72.009A FRACTURE OF UNSPECIFIED PART OF NECK OF UNSPECIFIED FEMUR, INITIAL ENCOUNTER FOR CLOSED FRACTURE: Chronic | ICD-10-CM

## 2018-02-08 DIAGNOSIS — I82.401 ACUTE EMBOLISM AND THROMBOSIS OF UNSPECIFIED DEEP VEINS OF RIGHT LOWER EXTREMITY: ICD-10-CM

## 2018-02-08 DIAGNOSIS — Z98.89 OTHER SPECIFIED POSTPROCEDURAL STATES: Chronic | ICD-10-CM

## 2018-02-08 DIAGNOSIS — Z90.710 ACQUIRED ABSENCE OF BOTH CERVIX AND UTERUS: Chronic | ICD-10-CM

## 2018-02-08 DIAGNOSIS — S82.001A UNSPECIFIED FRACTURE OF RIGHT PATELLA, INITIAL ENCOUNTER FOR CLOSED FRACTURE: ICD-10-CM

## 2018-02-08 DIAGNOSIS — Z96.641 PRESENCE OF RIGHT ARTIFICIAL HIP JOINT: Chronic | ICD-10-CM

## 2018-02-08 PROCEDURE — 93971 EXTREMITY STUDY: CPT | Mod: 26,RT

## 2018-02-08 PROCEDURE — 93971 EXTREMITY STUDY: CPT

## 2018-02-08 PROCEDURE — 99024 POSTOP FOLLOW-UP VISIT: CPT

## 2018-02-08 RX ORDER — SULFAMETHOXAZOLE AND TRIMETHOPRIM 800; 160 MG/1; MG/1
800-160 TABLET ORAL TWICE DAILY
Qty: 20 | Refills: 0 | Status: ACTIVE | COMMUNITY
Start: 2018-02-08 | End: 1900-01-01

## 2018-02-09 ENCOUNTER — OTHER (OUTPATIENT)
Age: 73
End: 2018-02-09

## 2018-02-09 DIAGNOSIS — S82.041A DISPLACED COMMINUTED FRACTURE OF RIGHT PATELLA, INITIAL ENCOUNTER FOR CLOSED FRACTURE: ICD-10-CM

## 2018-02-09 DIAGNOSIS — Z86.718 PERSONAL HISTORY OF OTHER VENOUS THROMBOSIS AND EMBOLISM: ICD-10-CM
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
## 2018-02-09 DIAGNOSIS — Z85.42 PERSONAL HISTORY OF MALIGNANT NEOPLASM OF OTHER PARTS OF UTERUS: ICD-10-CM

## 2018-02-09 DIAGNOSIS — S82.009A UNSPECIFIED FRACTURE OF UNSPECIFIED PATELLA, INITIAL ENCOUNTER FOR CLOSED FRACTURE: ICD-10-CM
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
## 2018-02-09 DIAGNOSIS — B00.9 HERPESVIRAL INFECTION, UNSPECIFIED: ICD-10-CM

## 2018-02-09 DIAGNOSIS — I10 ESSENTIAL (PRIMARY) HYPERTENSION: ICD-10-CM

## 2018-02-14 ENCOUNTER — APPOINTMENT (OUTPATIENT)
Dept: ORTHOPEDIC SURGERY | Facility: CLINIC | Age: 73
End: 2018-02-14
Payer: OTHER MISCELLANEOUS

## 2018-02-14 VITALS
WEIGHT: 117 LBS | DIASTOLIC BLOOD PRESSURE: 76 MMHG | BODY MASS INDEX: 23.59 KG/M2 | HEIGHT: 59 IN | SYSTOLIC BLOOD PRESSURE: 136 MMHG | HEART RATE: 94 BPM

## 2018-02-14 PROCEDURE — 99024 POSTOP FOLLOW-UP VISIT: CPT

## 2018-02-14 PROCEDURE — 73560 X-RAY EXAM OF KNEE 1 OR 2: CPT | Mod: RT

## 2018-02-20 ENCOUNTER — OUTPATIENT (OUTPATIENT)
Dept: OUTPATIENT SERVICES | Facility: HOSPITAL | Age: 73
LOS: 1 days | End: 2018-02-20
Payer: COMMERCIAL

## 2018-02-20 DIAGNOSIS — Z90.710 ACQUIRED ABSENCE OF BOTH CERVIX AND UTERUS: Chronic | ICD-10-CM

## 2018-02-20 DIAGNOSIS — Z98.89 OTHER SPECIFIED POSTPROCEDURAL STATES: Chronic | ICD-10-CM

## 2018-02-20 DIAGNOSIS — Z96.641 PRESENCE OF RIGHT ARTIFICIAL HIP JOINT: Chronic | ICD-10-CM

## 2018-02-20 DIAGNOSIS — S62.109A FRACTURE OF UNSPECIFIED CARPAL BONE, UNSPECIFIED WRIST, INITIAL ENCOUNTER FOR CLOSED FRACTURE: Chronic | ICD-10-CM

## 2018-02-20 DIAGNOSIS — Z51.89 ENCOUNTER FOR OTHER SPECIFIED AFTERCARE: ICD-10-CM

## 2018-02-20 DIAGNOSIS — S72.009A FRACTURE OF UNSPECIFIED PART OF NECK OF UNSPECIFIED FEMUR, INITIAL ENCOUNTER FOR CLOSED FRACTURE: Chronic | ICD-10-CM

## 2018-02-21 ENCOUNTER — OTHER (OUTPATIENT)
Age: 73
End: 2018-02-21

## 2018-02-21 DIAGNOSIS — S82.001D UNSPECIFIED FRACTURE OF RIGHT PATELLA, SUBSEQUENT ENCOUNTER FOR CLOSED FRACTURE WITH ROUTINE HEALING: ICD-10-CM

## 2018-03-14 ENCOUNTER — APPOINTMENT (OUTPATIENT)
Dept: ORTHOPEDIC SURGERY | Facility: CLINIC | Age: 73
End: 2018-03-14
Payer: OTHER MISCELLANEOUS

## 2018-03-14 VITALS
DIASTOLIC BLOOD PRESSURE: 89 MMHG | HEART RATE: 82 BPM | BODY MASS INDEX: 23.59 KG/M2 | SYSTOLIC BLOOD PRESSURE: 165 MMHG | WEIGHT: 117 LBS | HEIGHT: 59 IN

## 2018-03-14 DIAGNOSIS — M25.511 PAIN IN RIGHT SHOULDER: ICD-10-CM

## 2018-03-14 PROCEDURE — 99024 POSTOP FOLLOW-UP VISIT: CPT

## 2018-03-14 PROCEDURE — 73560 X-RAY EXAM OF KNEE 1 OR 2: CPT | Mod: RT

## 2018-03-22 ENCOUNTER — OUTPATIENT (OUTPATIENT)
Dept: OUTPATIENT SERVICES | Facility: HOSPITAL | Age: 73
LOS: 1 days | End: 2018-03-22
Payer: MEDICARE

## 2018-03-22 DIAGNOSIS — Z96.641 PRESENCE OF RIGHT ARTIFICIAL HIP JOINT: Chronic | ICD-10-CM

## 2018-03-22 DIAGNOSIS — M25.511 PAIN IN RIGHT SHOULDER: ICD-10-CM

## 2018-03-22 DIAGNOSIS — Z90.710 ACQUIRED ABSENCE OF BOTH CERVIX AND UTERUS: Chronic | ICD-10-CM

## 2018-03-22 DIAGNOSIS — S62.109A FRACTURE OF UNSPECIFIED CARPAL BONE, UNSPECIFIED WRIST, INITIAL ENCOUNTER FOR CLOSED FRACTURE: Chronic | ICD-10-CM

## 2018-03-22 DIAGNOSIS — S72.009A FRACTURE OF UNSPECIFIED PART OF NECK OF UNSPECIFIED FEMUR, INITIAL ENCOUNTER FOR CLOSED FRACTURE: Chronic | ICD-10-CM

## 2018-03-22 DIAGNOSIS — Z51.89 ENCOUNTER FOR OTHER SPECIFIED AFTERCARE: ICD-10-CM

## 2018-03-22 DIAGNOSIS — Z98.89 OTHER SPECIFIED POSTPROCEDURAL STATES: Chronic | ICD-10-CM

## 2018-04-10 ENCOUNTER — OTHER (OUTPATIENT)
Age: 73
End: 2018-04-10

## 2018-04-11 ENCOUNTER — OTHER (OUTPATIENT)
Age: 73
End: 2018-04-11

## 2018-04-25 ENCOUNTER — APPOINTMENT (OUTPATIENT)
Dept: ORTHOPEDIC SURGERY | Facility: CLINIC | Age: 73
End: 2018-04-25
Payer: OTHER MISCELLANEOUS

## 2018-04-25 ENCOUNTER — OTHER (OUTPATIENT)
Age: 73
End: 2018-04-25

## 2018-04-25 VITALS
BODY MASS INDEX: 59.07 KG/M2 | DIASTOLIC BLOOD PRESSURE: 76 MMHG | SYSTOLIC BLOOD PRESSURE: 148 MMHG | HEIGHT: 59 IN | WEIGHT: 293 LBS

## 2018-04-25 PROCEDURE — 99024 POSTOP FOLLOW-UP VISIT: CPT

## 2018-04-25 PROCEDURE — 73560 X-RAY EXAM OF KNEE 1 OR 2: CPT | Mod: RT

## 2018-05-25 ENCOUNTER — OTHER (OUTPATIENT)
Age: 73
End: 2018-05-25

## 2018-05-29 ENCOUNTER — APPOINTMENT (OUTPATIENT)
Dept: ORTHOPEDIC SURGERY | Facility: CLINIC | Age: 73
End: 2018-05-29
Payer: OTHER MISCELLANEOUS

## 2018-05-29 VITALS
SYSTOLIC BLOOD PRESSURE: 125 MMHG | HEIGHT: 59 IN | DIASTOLIC BLOOD PRESSURE: 72 MMHG | HEART RATE: 85 BPM | BODY MASS INDEX: 23.79 KG/M2 | TEMPERATURE: 98.8 F | WEIGHT: 118 LBS

## 2018-05-29 DIAGNOSIS — S82.041D DISPLACED COMMINUTED FRACTURE OF RIGHT PATELLA, SUBSEQUENT ENCOUNTER FOR CLOSED FRACTURE WITH ROUTINE HEALING: ICD-10-CM

## 2018-05-29 PROCEDURE — 99214 OFFICE O/P EST MOD 30 MIN: CPT

## 2018-05-29 PROCEDURE — 73560 X-RAY EXAM OF KNEE 1 OR 2: CPT | Mod: RT

## 2018-05-30 ENCOUNTER — OTHER (OUTPATIENT)
Age: 73
End: 2018-05-30

## 2018-06-01 PROCEDURE — 97162 PT EVAL MOD COMPLEX 30 MIN: CPT

## 2018-06-01 PROCEDURE — 97110 THERAPEUTIC EXERCISES: CPT

## 2018-06-01 PROCEDURE — G8986: CPT | Mod: CJ

## 2018-06-01 PROCEDURE — G8984: CPT | Mod: CK

## 2018-06-01 PROCEDURE — 97010 HOT OR COLD PACKS THERAPY: CPT

## 2018-06-01 PROCEDURE — 97140 MANUAL THERAPY 1/> REGIONS: CPT

## 2018-06-01 PROCEDURE — G8985: CPT | Mod: CJ

## 2018-07-11 PROCEDURE — 97010 HOT OR COLD PACKS THERAPY: CPT

## 2018-07-11 PROCEDURE — 97140 MANUAL THERAPY 1/> REGIONS: CPT

## 2018-07-11 PROCEDURE — 97110 THERAPEUTIC EXERCISES: CPT

## 2018-07-11 PROCEDURE — 97162 PT EVAL MOD COMPLEX 30 MIN: CPT

## 2018-07-11 PROCEDURE — 97116 GAIT TRAINING THERAPY: CPT

## 2018-07-13 ENCOUNTER — OUTPATIENT (OUTPATIENT)
Dept: OUTPATIENT SERVICES | Facility: HOSPITAL | Age: 73
LOS: 1 days | End: 2018-07-13
Payer: COMMERCIAL

## 2018-07-13 DIAGNOSIS — S72.009A FRACTURE OF UNSPECIFIED PART OF NECK OF UNSPECIFIED FEMUR, INITIAL ENCOUNTER FOR CLOSED FRACTURE: Chronic | ICD-10-CM

## 2018-07-13 DIAGNOSIS — S82.001D UNSPECIFIED FRACTURE OF RIGHT PATELLA, SUBSEQUENT ENCOUNTER FOR CLOSED FRACTURE WITH ROUTINE HEALING: ICD-10-CM

## 2018-07-13 DIAGNOSIS — M25.511 PAIN IN RIGHT SHOULDER: ICD-10-CM

## 2018-07-13 DIAGNOSIS — Z96.641 PRESENCE OF RIGHT ARTIFICIAL HIP JOINT: Chronic | ICD-10-CM

## 2018-07-13 DIAGNOSIS — Z51.89 ENCOUNTER FOR OTHER SPECIFIED AFTERCARE: ICD-10-CM

## 2018-07-13 DIAGNOSIS — Z90.710 ACQUIRED ABSENCE OF BOTH CERVIX AND UTERUS: Chronic | ICD-10-CM

## 2018-07-13 DIAGNOSIS — Z98.89 OTHER SPECIFIED POSTPROCEDURAL STATES: Chronic | ICD-10-CM

## 2018-07-13 DIAGNOSIS — S62.109A FRACTURE OF UNSPECIFIED CARPAL BONE, UNSPECIFIED WRIST, INITIAL ENCOUNTER FOR CLOSED FRACTURE: Chronic | ICD-10-CM

## 2018-07-27 PROCEDURE — 97110 THERAPEUTIC EXERCISES: CPT

## 2018-07-27 PROCEDURE — 97010 HOT OR COLD PACKS THERAPY: CPT

## 2018-08-08 ENCOUNTER — APPOINTMENT (OUTPATIENT)
Dept: OBGYN | Facility: CLINIC | Age: 73
End: 2018-08-08
Payer: MEDICARE

## 2018-08-08 ENCOUNTER — RESULT CHARGE (OUTPATIENT)
Age: 73
End: 2018-08-08

## 2018-08-08 VITALS
DIASTOLIC BLOOD PRESSURE: 70 MMHG | WEIGHT: 119.13 LBS | SYSTOLIC BLOOD PRESSURE: 126 MMHG | HEIGHT: 59 IN | BODY MASS INDEX: 24.02 KG/M2

## 2018-08-08 DIAGNOSIS — N36.41 HYPERMOBILITY OF URETHRA: ICD-10-CM

## 2018-08-08 DIAGNOSIS — Z83.3 FAMILY HISTORY OF DIABETES MELLITUS: ICD-10-CM

## 2018-08-08 DIAGNOSIS — Z01.419 ENCOUNTER FOR GYNECOLOGICAL EXAMINATION (GENERAL) (ROUTINE) W/OUT ABNORMAL FINDINGS: ICD-10-CM

## 2018-08-08 LAB
BILIRUB UR QL STRIP: NEGATIVE
CLARITY UR: CLEAR
COLLECTION METHOD: NORMAL
GLUCOSE UR-MCNC: NEGATIVE
HCG UR QL: 0.2 EU/DL
HGB UR QL STRIP.AUTO: NEGATIVE
KETONES UR-MCNC: NEGATIVE
LEUKOCYTE ESTERASE UR QL STRIP: NORMAL
NITRITE UR QL STRIP: NEGATIVE
PH UR STRIP: 8.5
PROT UR STRIP-MCNC: 30
SP GR UR STRIP: 1.01

## 2018-08-08 PROCEDURE — G0101: CPT

## 2018-08-08 PROCEDURE — 99203 OFFICE O/P NEW LOW 30 MIN: CPT

## 2018-08-08 PROCEDURE — 81003 URINALYSIS AUTO W/O SCOPE: CPT | Mod: QW

## 2018-08-13 ENCOUNTER — OUTPATIENT (OUTPATIENT)
Dept: OUTPATIENT SERVICES | Facility: HOSPITAL | Age: 73
LOS: 1 days | End: 2018-08-13
Payer: MEDICARE

## 2018-08-13 ENCOUNTER — APPOINTMENT (OUTPATIENT)
Dept: RADIOLOGY | Facility: CLINIC | Age: 73
End: 2018-08-13
Payer: MEDICARE

## 2018-08-13 DIAGNOSIS — S62.109A FRACTURE OF UNSPECIFIED CARPAL BONE, UNSPECIFIED WRIST, INITIAL ENCOUNTER FOR CLOSED FRACTURE: Chronic | ICD-10-CM

## 2018-08-13 DIAGNOSIS — Z96.641 PRESENCE OF RIGHT ARTIFICIAL HIP JOINT: Chronic | ICD-10-CM

## 2018-08-13 DIAGNOSIS — Z98.89 OTHER SPECIFIED POSTPROCEDURAL STATES: Chronic | ICD-10-CM

## 2018-08-13 DIAGNOSIS — Z90.710 ACQUIRED ABSENCE OF BOTH CERVIX AND UTERUS: Chronic | ICD-10-CM

## 2018-08-13 DIAGNOSIS — S72.009A FRACTURE OF UNSPECIFIED PART OF NECK OF UNSPECIFIED FEMUR, INITIAL ENCOUNTER FOR CLOSED FRACTURE: Chronic | ICD-10-CM

## 2018-08-13 DIAGNOSIS — Z01.419 ENCOUNTER FOR GYNECOLOGICAL EXAMINATION (GENERAL) (ROUTINE) WITHOUT ABNORMAL FINDINGS: ICD-10-CM

## 2018-08-13 PROCEDURE — 77080 DXA BONE DENSITY AXIAL: CPT | Mod: 26

## 2018-08-13 PROCEDURE — 77080 DXA BONE DENSITY AXIAL: CPT

## 2018-08-14 ENCOUNTER — RX RENEWAL (OUTPATIENT)
Age: 73
End: 2018-08-14

## 2018-08-14 DIAGNOSIS — N39.0 URINARY TRACT INFECTION, SITE NOT SPECIFIED: ICD-10-CM

## 2018-08-14 LAB
APPEARANCE: ABNORMAL
BACTERIA UR CULT: ABNORMAL
BACTERIA: NEGATIVE
BILIRUBIN URINE: NEGATIVE
BLOOD URINE: NEGATIVE
COLOR: ABNORMAL
GLUCOSE QUALITATIVE U: NEGATIVE MG/DL
HYALINE CASTS: 8 /LPF
KETONES URINE: NEGATIVE
LEUKOCYTE ESTERASE URINE: NEGATIVE
MICROSCOPIC-UA: NORMAL
NITRITE URINE: NEGATIVE
PH URINE: 8.5
PROTEIN URINE: NEGATIVE MG/DL
RED BLOOD CELLS URINE: 8 /HPF
SPECIFIC GRAVITY URINE: 1.02
SQUAMOUS EPITHELIAL CELLS: 3 /HPF
UROBILINOGEN URINE: NEGATIVE MG/DL
WHITE BLOOD CELLS URINE: 1 /HPF

## 2018-08-14 RX ORDER — AMPICILLIN 500 MG/1
500 CAPSULE ORAL 4 TIMES DAILY
Qty: 20 | Refills: 0 | Status: ACTIVE | COMMUNITY
Start: 2018-08-14 | End: 1900-01-01

## 2018-08-20 ENCOUNTER — MESSAGE (OUTPATIENT)
Age: 73
End: 2018-08-20

## 2018-09-13 ENCOUNTER — OTHER (OUTPATIENT)
Age: 73
End: 2018-09-13

## 2019-12-12 NOTE — H&P ADULT - NSHPPOAPULMEMBOLUS_GEN_A_CORE
[facility]  Pediatric Specialty Clinic [place]      Pediatric Call Center [phone], option 1  Shayy Gonzales RN Care Coordinator:  [phone]    After Hours Needing Immediate Care:  [phone].  Ask for the on-call pediatric doctor for the specialty you are calling for be paged.  For dermatology urgent matters that cannot wait until the next business day, is over a holiday and/or a weekend please call [phone] and ask for the Dermatology Resident On-Call to be paged.    Prescription Renewals:  Please call your pharmacy first.  Your pharmacy must fax requests to [phone].  Please allow 2-3 days for prescriptions to be authorized.    If your physician has ordered a CT or MRI, you may schedule this test by calling [facility] in [place] at [phone].    **If your child is having a sedated procedure, they will need a history and physical done at their Primary Care Provider within 30 days of the procedure.  If your child was seen by the ordering provider in our office within 30 days of the procedure, their visit summary will work for the H&P unless they inform you otherwise.  If you have any questions, please call the RN Care Coordinator.**     no

## 2020-09-15 ENCOUNTER — RESULT REVIEW (OUTPATIENT)
Age: 75
End: 2020-09-15

## 2020-11-05 ENCOUNTER — APPOINTMENT (OUTPATIENT)
Dept: OPHTHALMOLOGY | Facility: CLINIC | Age: 75
End: 2020-11-05

## 2020-11-10 ENCOUNTER — APPOINTMENT (OUTPATIENT)
Dept: OPHTHALMOLOGY | Facility: CLINIC | Age: 75
End: 2020-11-10
Payer: MEDICARE

## 2020-11-10 ENCOUNTER — NON-APPOINTMENT (OUTPATIENT)
Age: 75
End: 2020-11-10

## 2020-11-10 PROCEDURE — 92002 INTRM OPH EXAM NEW PATIENT: CPT

## 2020-11-19 ENCOUNTER — APPOINTMENT (OUTPATIENT)
Dept: OPHTHALMOLOGY | Facility: CLINIC | Age: 75
End: 2020-11-19

## 2020-12-16 PROBLEM — Z01.419 ENCOUNTER FOR ANNUAL ROUTINE GYNECOLOGICAL EXAMINATION: Status: RESOLVED | Noted: 2018-08-08 | Resolved: 2020-12-16

## 2020-12-16 PROBLEM — N39.0 ACUTE UTI: Status: RESOLVED | Noted: 2018-08-14 | Resolved: 2020-12-16

## 2021-01-23 NOTE — H&P ADULT - AS O2 DELIVERY
Has The Growth Been Previously Biopsied?: has been previously biopsied
Body Location Override (Optional): Left lateral lower back
room air

## 2021-07-21 ENCOUNTER — NON-APPOINTMENT (OUTPATIENT)
Age: 76
End: 2021-07-21

## 2021-07-22 ENCOUNTER — APPOINTMENT (OUTPATIENT)
Dept: ORTHOPEDIC SURGERY | Facility: CLINIC | Age: 76
End: 2021-07-22
Payer: MEDICARE

## 2021-07-22 DIAGNOSIS — M25.471 EFFUSION, RIGHT ANKLE: ICD-10-CM

## 2021-07-22 DIAGNOSIS — S93.401A SPRAIN OF UNSPECIFIED LIGAMENT OF RIGHT ANKLE, INITIAL ENCOUNTER: ICD-10-CM

## 2021-07-22 PROCEDURE — 99213 OFFICE O/P EST LOW 20 MIN: CPT

## 2021-07-22 PROCEDURE — 73610 X-RAY EXAM OF ANKLE: CPT | Mod: 26,RT

## 2021-07-22 NOTE — HISTORY OF PRESENT ILLNESS
[FreeTextEntry1] : The patient is a 75 yo female who twisted her ankle over a small ledge on 7/20/21.  The patient presents with swelling and bruising present lateral ankle.  pain scale 1/10 and pain is improved since the injury.  She has no pain with walking and is wearing sandals today in office.  No numbness or tingling in the foot.  No other complaints.

## 2021-07-22 NOTE — PHYSICAL EXAM
[de-identified] : Right Ankle Exam\par \par General: Alert and oriented x3.  In no acute distress.  Pleasant in nature with a normal affect.  No apparent respiratory distress. \par Erythema, Warmth, Rubor: Negative\par Swelling: Positive, lateral. \par \par ROM:\par 1. Dorsiflexion: 10 degrees\par 2. Plantarflexion: 40 degrees\par 3. Inversion: 10 degrees\par 4. Eversion: 10 degrees\par \par Tenderness to Palpation: \par 1. Lateral Malleolus: Negative\par 2. Medial Malleolus: Negative\par 3. Proximal Fibular Pain: Negative\par 4. Heel Pain: Negative\par \par Ligament Pain:\par 1. ATFL/CFL/PTFL: + ATFL\par 2. Deltoid Ligaments: Negative\par \par Stability: \par 1. Anterior Drawer: Negative\par 2. Posterior Drawer: Negative\par \par Strength: 5/5 TA/GS/EHL\par \par Pulses: 2+ DP/PT Pulses\par \par Neuro: Intact motor and sensory\par \par Additional Test:\par 1. Mayer's Test: Negative\par 2. Syndesmosis Squeeze Test: Negative\par \par \par \par  [de-identified] : X-rays of the right ankle reviewed, 7/22/21: Soft tissue swelling laterally.  No fractures present.

## 2021-07-22 NOTE — DISCUSSION/SUMMARY
[de-identified] : Assessment: Right ankle injury.\par \par Plan:\par 1.  Patient offered physical therapy but would rather perform home exercises and stretches on her own.\par 2. Patient does not use medications.  She does use the holistic approach.  She will use Arnica gel for swelling and pain.\par 3. Return to normal activities as tolerated. \par 4. Continue with ice and heat therapy. \par 5. All questions answered.  Follow-up as needed. If pain persists consider advanced imaging in the future.  The patient understood the treatment plan.

## 2022-04-21 ENCOUNTER — TRANSCRIPTION ENCOUNTER (OUTPATIENT)
Age: 77
End: 2022-04-21

## 2022-06-13 ENCOUNTER — NON-APPOINTMENT (OUTPATIENT)
Age: 77
End: 2022-06-13

## 2022-09-09 ENCOUNTER — RX RENEWAL (OUTPATIENT)
Age: 77
End: 2022-09-09

## 2022-09-09 ENCOUNTER — APPOINTMENT (OUTPATIENT)
Dept: NEUROSURGERY | Facility: CLINIC | Age: 77
End: 2022-09-09

## 2022-09-09 VITALS
TEMPERATURE: 97.5 F | HEIGHT: 59 IN | BODY MASS INDEX: 21.97 KG/M2 | SYSTOLIC BLOOD PRESSURE: 161 MMHG | OXYGEN SATURATION: 97 % | DIASTOLIC BLOOD PRESSURE: 77 MMHG | WEIGHT: 109 LBS | HEART RATE: 84 BPM

## 2022-09-09 PROCEDURE — 99203 OFFICE O/P NEW LOW 30 MIN: CPT

## 2022-09-09 PROCEDURE — 99072 ADDL SUPL MATRL&STAF TM PHE: CPT

## 2022-09-09 RX ORDER — METHOCARBAMOL 500 MG/1
500 TABLET, FILM COATED ORAL 3 TIMES DAILY
Qty: 90 | Refills: 2 | Status: ACTIVE | COMMUNITY
Start: 2022-09-09 | End: 1900-01-01

## 2022-09-09 RX ORDER — TIZANIDINE 4 MG/1
4 TABLET ORAL
Qty: 90 | Refills: 1 | Status: ACTIVE | COMMUNITY
Start: 2022-09-09 | End: 1900-01-01
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
## 2022-09-09 NOTE — REVIEW OF SYSTEMS
[As Noted in HPI] : as noted in HPI [Tension Headache] : no tension-type headache [Negative] : Heme/Lymph

## 2022-09-09 NOTE — PHYSICAL EXAM
[General Appearance - Alert] : alert [General Appearance - In No Acute Distress] : in no acute distress [FreeTextEntry1] : Awake, alert, and oriented x 3. VSS. In no apparent distress. Short and long term memory intact. Speech is clear and appropriate. Affect is normal. Voice is strong. Respirations easy and even. Normal skin color and pigmentation. The sclera and conjunctiva normal. Ears, nose, and neck normal in appearance. EOMI, no nystagmus, facial sensation not intact, V1, V2, V3 distribution on left diminished, face symmetrical, hearing intact bilaterally, tongue and palate midline, head turning and shoulder shrug symmetric and no tongue deviation with protrusion. No pronator drift. No past-pointing, no tremors noted, no dysdiadochokinesia, and finger to nose dysmetria was not present. Romberg negative. \par Right hand dominant.\par \par Rises from a seated position in a comfortable fashion.\par \par Gait is well coordinated and stable without the use of an assistive device. Unable to perform tandem walk without loss of balance. Motor strength in the upper extremities 5/5 in the biceps, triceps, and hand . Motor strength in the lower extremities is 5/5 in the iliopsoas, quadriceps, and hamstrings. \par \par  [Oriented To Time, Place, And Person] : oriented to person, place, and time [Impaired Insight] : insight and judgment were intact [Affect] : the affect was normal [Person] : oriented to person [Place] : oriented to place [Time] : oriented to time [Short Term Intact] : short term memory intact [Remote Intact] : remote memory intact [Span Intact] : the attention span was normal [Concentration Intact] : normal concentrating ability [Fluency] : fluency intact [Comprehension] : comprehension intact [Current Events] : adequate knowledge of current events [Past History] : adequate knowledge of personal past history [Vocabulary] : adequate range of vocabulary [Cranial Nerves Optic (II)] : visual acuity intact bilaterally,  pupils equal round and reactive to light [Cranial Nerves Oculomotor (III)] : extraocular motion intact [Cranial Nerves Trigeminal (V)] : facial sensation intact symmetrically [Cranial Nerves Facial (VII)] : face symmetrical [Cranial Nerves Vestibulocochlear (VIII)] : hearing was intact bilaterally [Cranial Nerves Glossopharyngeal (IX)] : tongue and palate midline [Cranial Nerves Accessory (XI - Cranial And Spinal)] : head turning and shoulder shrug symmetric [Cranial Nerves Hypoglossal (XII)] : there was no tongue deviation with protrusion [Motor Tone] : muscle tone was normal in all four extremities [Motor Strength] : muscle strength was normal in all four extremities [No Muscle Atrophy] : normal bulk in all four extremities [Sensation Tactile Decrease] : light touch was intact [Abnormal Walk] : normal gait [Balance] : balance was intact [Past-pointing] : there was no past-pointing [Tremor] : no tremor present [2+] : Patella left 2+

## 2022-09-10 ENCOUNTER — APPOINTMENT (OUTPATIENT)
Dept: CT IMAGING | Facility: CLINIC | Age: 77
End: 2022-09-10

## 2022-09-10 ENCOUNTER — APPOINTMENT (OUTPATIENT)
Dept: MRI IMAGING | Facility: CLINIC | Age: 77
End: 2022-09-10

## 2022-09-10 ENCOUNTER — OUTPATIENT (OUTPATIENT)
Dept: OUTPATIENT SERVICES | Facility: HOSPITAL | Age: 77
LOS: 1 days | End: 2022-09-10
Payer: COMMERCIAL

## 2022-09-10 DIAGNOSIS — Z96.641 PRESENCE OF RIGHT ARTIFICIAL HIP JOINT: Chronic | ICD-10-CM

## 2022-09-10 DIAGNOSIS — Z90.710 ACQUIRED ABSENCE OF BOTH CERVIX AND UTERUS: Chronic | ICD-10-CM

## 2022-09-10 DIAGNOSIS — I62.00 NONTRAUMATIC SUBDURAL HEMORRHAGE, UNSPECIFIED: ICD-10-CM

## 2022-09-10 DIAGNOSIS — Z98.89 OTHER SPECIFIED POSTPROCEDURAL STATES: Chronic | ICD-10-CM

## 2022-09-10 DIAGNOSIS — S72.009A FRACTURE OF UNSPECIFIED PART OF NECK OF UNSPECIFIED FEMUR, INITIAL ENCOUNTER FOR CLOSED FRACTURE: Chronic | ICD-10-CM

## 2022-09-10 DIAGNOSIS — S62.109A FRACTURE OF UNSPECIFIED CARPAL BONE, UNSPECIFIED WRIST, INITIAL ENCOUNTER FOR CLOSED FRACTURE: Chronic | ICD-10-CM

## 2022-09-10 PROCEDURE — 72141 MRI NECK SPINE W/O DYE: CPT | Mod: 26

## 2022-09-10 PROCEDURE — 70450 CT HEAD/BRAIN W/O DYE: CPT

## 2022-09-10 PROCEDURE — 70450 CT HEAD/BRAIN W/O DYE: CPT | Mod: 26

## 2022-09-10 PROCEDURE — 72141 MRI NECK SPINE W/O DYE: CPT

## 2022-09-12 ENCOUNTER — NON-APPOINTMENT (OUTPATIENT)
Age: 77
End: 2022-09-12

## 2022-09-14 ENCOUNTER — NON-APPOINTMENT (OUTPATIENT)
Age: 77
End: 2022-09-14

## 2022-09-19 ENCOUNTER — NON-APPOINTMENT (OUTPATIENT)
Age: 77
End: 2022-09-19

## 2022-09-19 ENCOUNTER — APPOINTMENT (OUTPATIENT)
Dept: NEUROSURGERY | Facility: CLINIC | Age: 77
End: 2022-09-19

## 2022-09-19 VITALS
DIASTOLIC BLOOD PRESSURE: 83 MMHG | HEART RATE: 73 BPM | TEMPERATURE: 98.3 F | SYSTOLIC BLOOD PRESSURE: 179 MMHG | WEIGHT: 109 LBS | OXYGEN SATURATION: 96 % | BODY MASS INDEX: 21.97 KG/M2 | HEIGHT: 59 IN

## 2022-09-19 PROCEDURE — 99213 OFFICE O/P EST LOW 20 MIN: CPT

## 2022-09-19 PROCEDURE — 99072 ADDL SUPL MATRL&STAF TM PHE: CPT

## 2022-09-19 NOTE — DATA REVIEWED
[de-identified] : EXAM: [id number] - CT BRAIN - ORDERED BY: EDEN ZELAYA\par \par \par PROCEDURE DATE: 09/10/2022\par \par \par \par INTERPRETATION: CT head without IV contrast\par \par CLINICAL INFORMATION: Fall, follow-up LEFT Intracranial hemorrhage.\par \par TECHNIQUE: Contiguous axial 5 mm sections were obtained through the head. Sagittal and coronal 2-D reformatted images were also obtained. This scan was performed using automatic exposure control (radiation dose reduction software) to obtain a diagnostic image quality scan with patient dose as low as reasonably achievable.\par \par FINDINGS: No previous examinations are available for review.\par \par The brain demonstrates tiny known LEFT subdural hematoma. Mild anterior periventricular white matter ischemia. No acute cerebral cortical infarct is seen. No mass effect is found in the brain.\par \par The ventricles, sulci and basal cisterns appear unremarkable.\par \par The orbits are unremarkable. The paranasal sinuses are significant for a hypoplastic LEFT maxillary sinus containing mucosal thickening. The nasal cavity appears intact. The nasopharynx is symmetric. The central skull base, petrous temporal bones and calvarium remain intact.\par \par \par IMPRESSION: Tiny known LEFT subdural hematoma. Mild anterior periventricular white matter ischemia.\par \par --- End of Report --- [de-identified] : EXAM: [id number] - MR SPINE CERVICAL - ORDERED BY: EDEN ZELAYA\par \par \par PROCEDURE DATE: 09/10/2022\par \par \par \par INTERPRETATION: Clinical indication: Neck and right upper extremity pain.\par \par Comparison: None.\par \par Technique:\par MRI of the cervical spine.\par No intravenous contrast was administered.\par \par FINDINGS:\par \par There is a marked reversal of the cervical lordosis centered at C5. As a minimal anterior subluxation of C3 on C4 and C4 on C5 with a mild retrolisthesis of C5 on C6, C6 on C7 and C7 on T1. There is disc height loss greatest at C5-C6 through C7-T1. The cord signal is normal. The cervical medullary junction appears unremarkable. There is a cyst posteriorly at the skull.\par \par The findings at the following levels are:\par \par C1-C2: Normal.\par \par C2-3: Mild bilateral facet arthropathy.\par \par C3-4: Mild right uncovertebral arthropathy with severe right and mild left facet arthropathy resulting in minimal right foraminal narrowing.\par \par C4-5: Moderate diffuse disc osteophyte complex which contacts and flattens the ventral cord without effacing the posterior thecal sac. There is mild left foraminal narrowing.\par \par C5-6: Minimal diffuse disc osteophyte complex with a large left far foraminal disc osteophyte protrusion. Disc osteophyte contacts and flattens the ventral cord without effacing the posterior thecal sac. There is moderate to severe left and mild right foraminal narrowing.\par \par C6-7: Mild to moderate disc osteophyte complex asymmetric to the left which is most prominent anteriorly. Disc osteophyte comes close the ventral cord with moderate left foraminal narrowing.\par \par C7-T1: Moderate diffuse disc osteophyte complex comes close the ventral cord. There is minimal left and moderate to severe right foraminal narrowing.\par \par Impression: Marked reversal of the cervical lordosis with multilevel subluxations and cervical spondylosis contributing to moderate to severe left foraminal narrowing at C5-C6, moderate left foraminal narrowing at C6-C7, and moderate severe right foraminal narrowing at C7-T1.\par \par --- End of Report ---\par \par

## 2022-09-19 NOTE — ASSESSMENT
[FreeTextEntry1] : Ms. Bonilla presents with above history and imaging reports.  Patient is neurologically intact. I have personally reviewed her CT head which shows improvement of her tiny left SDH and chronic DDD of neck with reversal of lordosis.  No acute changes. Patient will be taking flight to [place] 9/21/2022, stopping in [place] and accompanied by her 2 daughters, one is an RN. \par I would like her to continue with her Keppra 500 mg twice daily for seizure prophylaxis until she returns from [place] via plane. \par She should follow up 2-3 weeks without any additional imaging. \par Patient has been given an opportunity to ask and have their questions answered to their satisfaction.\par Patient knows to call the office if there are any new or worsening symptoms.\par \par \par I, Dr. Vivek Hu, personally performed the evaluation and management (E/M) services for this patient.  That E/M includes assessment of the initial examination, assessing the pertinent medical and surgical history, and establishing the plan of care.  At the time of the visit, my ACP, Melita Bell, actively participated in the evaluation and management services for this patient to be followed going forward in accordance with the plan documented in the clinical note.\par \par \par
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
## 2022-09-19 NOTE — REASON FOR VISIT
[Follow-Up: _____] : a [unfilled] follow-up visit [New Patient Visit] : a new patient visit [FreeTextEntry1] : Left subdural hematoma, neck pain

## 2022-09-26 ENCOUNTER — NON-APPOINTMENT (OUTPATIENT)
Age: 77
End: 2022-09-26

## 2022-10-06 ENCOUNTER — APPOINTMENT (OUTPATIENT)
Dept: NEUROSURGERY | Facility: CLINIC | Age: 77
End: 2022-10-06

## 2022-10-06 VITALS
OXYGEN SATURATION: 97 % | BODY MASS INDEX: 21.77 KG/M2 | SYSTOLIC BLOOD PRESSURE: 178 MMHG | DIASTOLIC BLOOD PRESSURE: 83 MMHG | WEIGHT: 108 LBS | HEIGHT: 59 IN | HEART RATE: 84 BPM | TEMPERATURE: 97.3 F

## 2022-10-06 DIAGNOSIS — I62.00 NONTRAUMATIC SUBDURAL HEMORRHAGE, UNSPECIFIED: ICD-10-CM

## 2022-10-06 DIAGNOSIS — M54.2 CERVICALGIA: ICD-10-CM

## 2022-10-06 PROCEDURE — 99213 OFFICE O/P EST LOW 20 MIN: CPT

## 2022-10-06 PROCEDURE — 99072 ADDL SUPL MATRL&STAF TM PHE: CPT

## 2022-10-06 NOTE — REVIEW OF SYSTEMS
[As Noted in HPI] : as noted in HPI [Negative] : Heme/Lymph [Tension Headache] : no tension-type headache
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
## 2022-10-06 NOTE — DATA REVIEWED
[de-identified] : EXAM: [id number] - CT BRAIN - ORDERED BY: EDEN ZELAYA\par \par \par PROCEDURE DATE: 09/10/2022\par \par \par \par INTERPRETATION: CT head without IV contrast\par \par CLINICAL INFORMATION: Fall, follow-up LEFT Intracranial hemorrhage.\par \par TECHNIQUE: Contiguous axial 5 mm sections were obtained through the head. Sagittal and coronal 2-D reformatted images were also obtained. This scan was performed using automatic exposure control (radiation dose reduction software) to obtain a diagnostic image quality scan with patient dose as low as reasonably achievable.\par \par FINDINGS: No previous examinations are available for review.\par \par The brain demonstrates tiny known LEFT subdural hematoma. Mild anterior periventricular white matter ischemia. No acute cerebral cortical infarct is seen. No mass effect is found in the brain.\par \par The ventricles, sulci and basal cisterns appear unremarkable.\par \par The orbits are unremarkable. The paranasal sinuses are significant for a hypoplastic LEFT maxillary sinus containing mucosal thickening. The nasal cavity appears intact. The nasopharynx is symmetric. The central skull base, petrous temporal bones and calvarium remain intact.\par \par \par IMPRESSION: Tiny known LEFT subdural hematoma. Mild anterior periventricular white matter ischemia.\par \par --- End of Report --- [de-identified] : EXAM: [id number] - MR SPINE CERVICAL - ORDERED BY: EDEN ZELAYA\par \par \par PROCEDURE DATE: 09/10/2022\par \par \par \par INTERPRETATION: Clinical indication: Neck and right upper extremity pain.\par \par Comparison: None.\par \par Technique:\par MRI of the cervical spine.\par No intravenous contrast was administered.\par \par FINDINGS:\par \par There is a marked reversal of the cervical lordosis centered at C5. As a minimal anterior subluxation of C3 on C4 and C4 on C5 with a mild retrolisthesis of C5 on C6, C6 on C7 and C7 on T1. There is disc height loss greatest at C5-C6 through C7-T1. The cord signal is normal. The cervical medullary junction appears unremarkable. There is a cyst posteriorly at the skull.\par \par The findings at the following levels are:\par \par C1-C2: Normal.\par \par C2-3: Mild bilateral facet arthropathy.\par \par C3-4: Mild right uncovertebral arthropathy with severe right and mild left facet arthropathy resulting in minimal right foraminal narrowing.\par \par C4-5: Moderate diffuse disc osteophyte complex which contacts and flattens the ventral cord without effacing the posterior thecal sac. There is mild left foraminal narrowing.\par \par C5-6: Minimal diffuse disc osteophyte complex with a large left far foraminal disc osteophyte protrusion. Disc osteophyte contacts and flattens the ventral cord without effacing the posterior thecal sac. There is moderate to severe left and mild right foraminal narrowing.\par \par C6-7: Mild to moderate disc osteophyte complex asymmetric to the left which is most prominent anteriorly. Disc osteophyte comes close the ventral cord with moderate left foraminal narrowing.\par \par C7-T1: Moderate diffuse disc osteophyte complex comes close the ventral cord. There is minimal left and moderate to severe right foraminal narrowing.\par \par Impression: Marked reversal of the cervical lordosis with multilevel subluxations and cervical spondylosis contributing to moderate to severe left foraminal narrowing at C5-C6, moderate left foraminal narrowing at C6-C7, and moderate severe right foraminal narrowing at C7-T1.\par \par --- End of Report ---\par \par

## 2022-10-06 NOTE — PHYSICAL EXAM
[General Appearance - Alert] : alert [General Appearance - In No Acute Distress] : in no acute distress [Oriented To Time, Place, And Person] : oriented to person, place, and time [Impaired Insight] : insight and judgment were intact [Affect] : the affect was normal [Person] : oriented to person [Place] : oriented to place [Time] : oriented to time [Short Term Intact] : short term memory intact [Remote Intact] : remote memory intact [Span Intact] : the attention span was normal [Concentration Intact] : normal concentrating ability [Fluency] : fluency intact [Comprehension] : comprehension intact [Current Events] : adequate knowledge of current events [Past History] : adequate knowledge of personal past history [Vocabulary] : adequate range of vocabulary [Cranial Nerves Optic (II)] : visual acuity intact bilaterally,  pupils equal round and reactive to light [Cranial Nerves Oculomotor (III)] : extraocular motion intact [Cranial Nerves Trigeminal (V)] : facial sensation intact symmetrically [Cranial Nerves Facial (VII)] : face symmetrical [Cranial Nerves Vestibulocochlear (VIII)] : hearing was intact bilaterally [Cranial Nerves Glossopharyngeal (IX)] : tongue and palate midline [Cranial Nerves Accessory (XI - Cranial And Spinal)] : head turning and shoulder shrug symmetric [Cranial Nerves Hypoglossal (XII)] : there was no tongue deviation with protrusion [Motor Tone] : muscle tone was normal in all four extremities [Motor Strength] : muscle strength was normal in all four extremities [No Muscle Atrophy] : normal bulk in all four extremities [Sensation Tactile Decrease] : light touch was intact [Abnormal Walk] : normal gait [Balance] : balance was intact [2+] : Patella left 2+ [FreeTextEntry1] : Awake, alert, and oriented x 3. VSS. In no apparent distress. Short and long term memory intact. Speech is clear and appropriate. Affect is normal. Voice is strong. Respirations easy and even. Normal skin color and pigmentation. The sclera and conjunctiva normal. Ears, nose, and neck normal in appearance. EOMI, no nystagmus, facial sensation not intact, V1, V2, V3 distribution on left diminished, face symmetrical, hearing intact bilaterally, tongue and palate midline, head turning and shoulder shrug symmetric and no tongue deviation with protrusion. No pronator drift. No past-pointing, no tremors noted, no dysdiadochokinesia, and finger to nose dysmetria was not present. Romberg negative. \par Right hand dominant.\par \par Rises from a seated position in a comfortable fashion.\par \par Gait is well coordinated and stable without the use of an assistive device. Unable to perform tandem walk without loss of balance. Motor strength in the upper extremities 5/5 in the biceps, triceps, and hand . Motor strength in the lower extremities is 5/5 in the iliopsoas, quadriceps, and hamstrings. \par \par  [Past-pointing] : there was no past-pointing [Tremor] : no tremor present

## 2022-10-06 NOTE — ASSESSMENT
[FreeTextEntry1] : Ms. Bonilla presents with above history and imaging reports.  Patient is neurologically intact. I have personally reviewed her CT head which shows improvement of her tiny left SDH and chronic DDD of neck with reversal of lordosis.  No acute changes. Patient will be taking flight to [place] 9/21/2022, stopping in [place] and accompanied by her 2 daughters, one is an RN. \par Repeat CT head w/o contrast to assess resolution of ICH\par follow up after imaging. . \par Patient has been given an opportunity to ask and have their questions answered to their satisfaction.\par Patient knows to call the office if there are any new or worsening symptoms.\par \par \par I, Dr. Vivek Hu, personally performed the evaluation and management (E/M) services for this patient.  That E/M includes assessment of the initial examination, assessing the pertinent medical and surgical history, and establishing the plan of care.  At the time of the visit, my ACP, Melita Bell, actively participated in the evaluation and management services for this patient to be followed going forward in accordance with the plan documented in the clinical note.\par \par \par

## 2022-10-07 ENCOUNTER — APPOINTMENT (OUTPATIENT)
Dept: ORTHOPEDIC SURGERY | Facility: CLINIC | Age: 77
End: 2022-10-07

## 2022-10-07 VITALS — HEIGHT: 59 IN | WEIGHT: 108 LBS | BODY MASS INDEX: 21.77 KG/M2

## 2022-10-07 PROCEDURE — 99072 ADDL SUPL MATRL&STAF TM PHE: CPT

## 2022-10-07 PROCEDURE — 99203 OFFICE O/P NEW LOW 30 MIN: CPT

## 2022-10-07 NOTE — DATA REVIEWED
[MRI] : MRI [Cervical Spine] : cervical spine [Report was reviewed and noted in the chart] : The report was reviewed and noted in the chart [I independently reviewed and interpreted images and report] : I independently reviewed and interpreted images and report [I reviewed the films/CD and additionally noted] : I reviewed the films/CD and additionally noted [FreeTextEntry1] : Cervical spine MRI 09/10/2022 - [facility]\par C2-3: mild DDD\par C3-4: mild to moderate DDD, with mild right foraminal stenosis\par C4-5: advanced DDD, with mild central stenosis \par C5-6: advanced DDD, with moderate bilateral foraminal stenosis\par C6-7: advanced DDD, with moderate left foraminal stenosis and mild right foraminal stenosis\par C7-T1: moderate DDD, with mild stenosis\par kyphotic deformity of the neck measuring 30 degrees with the apex at C4-5 disc, mild scoliotic deformity  \par
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
## 2022-10-07 NOTE — PHYSICAL EXAM
[Normal Coordination] : normal coordination [Normal DTR UE/LE] : normal DTR UE/LE  [Normal Sensation] : normal sensation [Normal Mood and Affect] : normal mood and affect [Orientated] : orientated [Able to Communicate] : able to communicate [Normal Skin] : normal skin [No obvious lymphadenopathy in areas examined] : no obvious lymphadenopathy in areas examined [Well Developed] : well developed [Well Nourished] : well nourished [Peripheral vascular exam is grossly normal] : peripheral vascular exam is grossly normal [No Respiratory Distress] : no respiratory distress [] : negative Breaux reflex [[id number]] : forward flexion 30 degrees [de-identified] : extension 30 degrees [de-identified] : left lateral flexion 30 degrees [de-identified] : left lateral rotation 75 degrees [de-identified] : right lateral flexion 30 degrees [[id number]] : right lateral rotation 75 degrees
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
## 2022-10-10 ENCOUNTER — APPOINTMENT (OUTPATIENT)
Dept: CT IMAGING | Facility: CLINIC | Age: 77
End: 2022-10-10

## 2022-10-10 ENCOUNTER — RX RENEWAL (OUTPATIENT)
Age: 77
End: 2022-10-10

## 2022-10-10 ENCOUNTER — OUTPATIENT (OUTPATIENT)
Dept: OUTPATIENT SERVICES | Facility: HOSPITAL | Age: 77
LOS: 1 days | End: 2022-10-10
Payer: COMMERCIAL

## 2022-10-10 DIAGNOSIS — S62.109A FRACTURE OF UNSPECIFIED CARPAL BONE, UNSPECIFIED WRIST, INITIAL ENCOUNTER FOR CLOSED FRACTURE: Chronic | ICD-10-CM

## 2022-10-10 DIAGNOSIS — Z98.89 OTHER SPECIFIED POSTPROCEDURAL STATES: Chronic | ICD-10-CM

## 2022-10-10 DIAGNOSIS — Z90.710 ACQUIRED ABSENCE OF BOTH CERVIX AND UTERUS: Chronic | ICD-10-CM

## 2022-10-10 DIAGNOSIS — Z96.641 PRESENCE OF RIGHT ARTIFICIAL HIP JOINT: Chronic | ICD-10-CM

## 2022-10-10 DIAGNOSIS — I62.00 NONTRAUMATIC SUBDURAL HEMORRHAGE, UNSPECIFIED: ICD-10-CM

## 2022-10-10 DIAGNOSIS — S72.009A FRACTURE OF UNSPECIFIED PART OF NECK OF UNSPECIFIED FEMUR, INITIAL ENCOUNTER FOR CLOSED FRACTURE: Chronic | ICD-10-CM

## 2022-10-10 PROCEDURE — 70450 CT HEAD/BRAIN W/O DYE: CPT

## 2022-10-10 PROCEDURE — 70450 CT HEAD/BRAIN W/O DYE: CPT | Mod: 26

## 2022-10-10 RX ORDER — LEVETIRACETAM 500 MG/1
500 TABLET, FILM COATED ORAL TWICE DAILY
Qty: 180 | Refills: 0 | Status: ACTIVE | COMMUNITY
Start: 2022-09-09 | End: 1900-01-01

## 2022-10-10 RX ORDER — LEVETIRACETAM 500 MG/1
500 TABLET, FILM COATED ORAL TWICE DAILY
Qty: 180 | Refills: 0 | Status: ACTIVE | COMMUNITY
Start: 2022-10-10 | End: 1900-01-01

## 2022-10-11 ENCOUNTER — NON-APPOINTMENT (OUTPATIENT)
Age: 77
End: 2022-10-11

## 2022-11-30 ENCOUNTER — APPOINTMENT (OUTPATIENT)
Dept: ORTHOPEDIC SURGERY | Facility: CLINIC | Age: 77
End: 2022-11-30

## 2022-11-30 VITALS — BODY MASS INDEX: 21.77 KG/M2 | HEIGHT: 59 IN | WEIGHT: 108 LBS

## 2022-11-30 DIAGNOSIS — M62.838 OTHER MUSCLE SPASM: ICD-10-CM

## 2022-11-30 DIAGNOSIS — M47.812 SPONDYLOSIS W/OUT MYELOPATHY OR RADICULOPATHY, CERVICAL REGION: ICD-10-CM

## 2022-11-30 PROCEDURE — 99072 ADDL SUPL MATRL&STAF TM PHE: CPT

## 2022-11-30 PROCEDURE — 99214 OFFICE O/P EST MOD 30 MIN: CPT
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
## 2022-12-07 NOTE — PHYSICAL EXAM
[Normal Coordination] : normal coordination [Normal DTR UE/LE] : normal DTR UE/LE  [Normal Sensation] : normal sensation [Normal Mood and Affect] : normal mood and affect [Orientated] : orientated [Able to Communicate] : able to communicate [Normal Skin] : normal skin [No obvious lymphadenopathy in areas examined] : no obvious lymphadenopathy in areas examined [Well Developed] : well developed [Well Nourished] : well nourished [Peripheral vascular exam is grossly normal] : peripheral vascular exam is grossly normal [No Respiratory Distress] : no respiratory distress [NL (45)] : right lateral flexion 45 degrees [NL (80)] : right lateral rotation 80 degrees [Rotation to left] : rotation to left [5___] : right grasp 5[unfilled]/5 [] : negative Breaux reflex [TWNoteComboBox7] : False [de-identified] : False [de-identified] : False [de-identified] : left lateral rotation 60 degrees [de-identified] : False [TWNoteComboBox6] : False

## 2022-12-07 NOTE — ASSESSMENT
[FreeTextEntry1] : The patient is a 77 year old female with multilevel advanced cervical DDD and a recent history of  subdural hematoma cared for by Dr. Hu of [facility]. Patient will continue physical therapy for cervical spine to regain her ROM and alleviate her muscle stiffness. Follow up in 6 weeks for interval recheck. \par \par Prior to appointment and during encounter with patient extensive medical records were reviewed including but not limited to, hospital records, out patient records, imaging results, and lab data. During this appointment the patient was examined, diagnoses were discussed and explained in a face to face manner. In addition extensive time was spent reviewing aforementioned diagnostic studies. Counseling including abnormal image results, differential diagnoses, treatment options, risk and benefits, lifestyle changes, current condition, and current medications was performed. Patient's comments, questions, and concerns were address and patient verbalized understanding. Based on this patient's presentation at our office, which is an orthopedic spine surgeon's office, this patient inherently / intrinsically has a risk, however minute, of developing issues such as Cauda equina syndrome, bowel and bladder changes, or progression of motor or neurological deficits such as paralysis which may be permanent.\par \par I, Johanna Mccarthy, attest that this documentation has been prepared under the direction and in the presence of provider Dr. Ybarra.\par

## 2022-12-08 ENCOUNTER — NON-APPOINTMENT (OUTPATIENT)
Age: 77
End: 2022-12-08

## 2022-12-21 NOTE — ED ADULT NURSE NOTE - QUALITY
Minoxidil Counseling: Minoxidil is a topical medication which can increase blood flow where it is applied. It is uncertain how this medication increases hair growth. Side effects are uncommon and include stinging and allergic reactions. Ketoconazole Counseling:   Patient counseled regarding improving absorption with orange juice.  Adverse effects include but are not limited to breast enlargement, headache, diarrhea, nausea, upset stomach, liver function test abnormalities, taste disturbance, and stomach pain.  There is a rare possibility of liver failure that can occur when taking ketoconazole. The patient understands that monitoring of LFTs may be required, especially at baseline. The patient verbalized understanding of the proper use and possible adverse effects of ketoconazole.  All of the patient's questions and concerns were addressed. Cephalexin Pregnancy And Lactation Text: This medication is Pregnancy Category B and considered safe during pregnancy.  It is also excreted in breast milk but can be used safely for shorter doses. Xolair Pregnancy And Lactation Text: This medication is Pregnancy Category B and is considered safe during pregnancy. This medication is excreted in breast milk. Valtrex Pregnancy And Lactation Text: this medication is Pregnancy Category B and is considered safe during pregnancy. This medication is not directly found in breast milk but it's metabolite acyclovir is present. Cimzia Pregnancy And Lactation Text: This medication crosses the placenta but can be considered safe in certain situations. Cimzia may be excreted in breast milk. Erivedge Pregnancy And Lactation Text: This medication is Pregnancy Category X and is absolutely contraindicated during pregnancy. It is unknown if it is excreted in breast milk. Detail Level: Simple Skyrizi Counseling: I discussed with the patient the risks of risankizumab-rzaa including but not limited to immunosuppression, and serious infections.  The patient understands that monitoring is required including a PPD at baseline and must alert us or the primary physician if symptoms of infection or other concerning signs are noted. Wartpeel Pregnancy And Lactation Text: This medication is Pregnancy Category X and contraindicated in pregnancy and in women who may become pregnant. It is unknown if this medication is excreted in breast milk. Tazorac Pregnancy And Lactation Text: This medication is not safe during pregnancy. It is unknown if this medication is excreted in breast milk. Cantharidin Counseling: Calcipotriene Counseling:  I discussed with the patient the risks of calcipotriene including but not limited to erythema, scaling, itching, and irritation. Hydroxyzine Pregnancy And Lactation Text: This medication is not safe during pregnancy and should not be taken. It is also excreted in breast milk and breast feeding isn't recommended. Cyclophosphamide Pregnancy And Lactation Text: This medication is Pregnancy Category D and it isn't considered safe during pregnancy. This medication is excreted in breast milk. Elidel Pregnancy And Lactation Text: This medication is Pregnancy Category C. It is unknown if this medication is excreted in breast milk. Opioid Counseling: I discussed with the patient the potential side effects of opioids including but not limited to addiction, altered mental status, and depression. I stressed avoiding alcohol, benzodiazepines, muscle relaxants and sleep aids unless specifically okayed by a physician. The patient verbalized understanding of the proper use and possible adverse effects of opioids. All of the patient's questions and concerns were addressed. They were instructed to flush the remaining pills down the toilet if they did not need them for pain. Birth Control Pills Counseling: Birth Control Pill Counseling: I discussed with the patient the potential side effects of OCPs including but not limited to increased risk of stroke, heart attack, thrombophlebitis, deep venous thrombosis, hepatic adenomas, breast changes, GI upset, headaches, and depression.  The patient verbalized understanding of the proper use and possible adverse effects of OCPs. All of the patient's questions and concerns were addressed. Propranolol Counseling:  I discussed with the patient the risks of propranolol including but not limited to low heart rate, low blood pressure, low blood sugar, restlessness and increased cold sensitivity. They should call the office if they experience any of these side effects. Qbrexza Counseling:  I discussed with the patient the risks of Qbrexza including but not limited to headache, mydriasis, blurred vision, dry eyes, nasal dryness, dry mouth, dry throat, dry skin, urinary hesitation, and constipation.  Local skin reactions including erythema, burning, stinging, and itching can also occur. Nsaids Pregnancy And Lactation Text: These medications are considered safe up to 30 weeks gestation. It is excreted in breast milk. Olumiant Counseling: I discussed with the patient the risks of Olumiant therapy including but not limited to upper respiratory tract infections, shingles, cold sores, and nausea. Live vaccines should be avoided.  This medication has been linked to serious infections; higher rate of mortality; malignancy and lymphoproliferative disorders; major adverse cardiovascular events; thrombosis; gastrointestinal perforations; neutropenia; lymphopenia; anemia; liver enzyme elevations; and lipid elevations. Dapsone Pregnancy And Lactation Text: This medication is Pregnancy Category C and is not considered safe during pregnancy or breast feeding. Ilumya Pregnancy And Lactation Text: The risk during pregnancy and breastfeeding is uncertain with this medication. Aklief Pregnancy And Lactation Text: It is unknown if this medication is safe to use during pregnancy.  It is unknown if this medication is excreted in breast milk.  Breastfeeding women should use the topical cream on the smallest area of the skin for the shortest time needed while breastfeeding.  Do not apply to nipple and areola. Cosentyx Counseling:  I discussed with the patient the risks of Cosentyx including but not limited to worsening of Crohn's disease, immunosuppression, allergic reactions and infections.  The patient understands that monitoring is required including a PPD at baseline and must alert us or the primary physician if symptoms of infection or other concerning signs are noted. Acitretin Pregnancy And Lactation Text: This medication is Pregnancy Category X and should not be given to women who are pregnant or may become pregnant in the future. This medication is excreted in breast milk. Tetracycline Counseling: Patient counseled regarding possible photosensitivity and increased risk for sunburn.  Patient instructed to avoid sunlight, if possible.  When exposed to sunlight, patients should wear protective clothing, sunglasses, and sunscreen.  The patient was instructed to call the office immediately if the following severe adverse effects occur:  hearing changes, easy bruising/bleeding, severe headache, or vision changes.  The patient verbalized understanding of the proper use and possible adverse effects of tetracycline.  All of the patient's questions and concerns were addressed. Patient understands to avoid pregnancy while on therapy due to potential birth defects. Libtayo Counseling- I discussed with the patient the risks of Libtayo including but not limited to nausea, vomiting, diarrhea, and bone or muscle pain.  The patient verbalized understanding of the proper use and possible adverse effects of Libtayo.  All of the patient's questions and concerns were addressed. Opioid Pregnancy And Lactation Text: These medications can lead to premature delivery and should be avoided during pregnancy. These medications are also present in breast milk in small amounts. Eucrisa Counseling: Patient may experience a mild burning sensation during topical application. Eucrisa is not approved in children less than 3 months of age. Use Enhanced Medication Counseling?: No Ketoconazole Pregnancy And Lactation Text: This medication is Pregnancy Category C and it isn't know if it is safe during pregnancy. It is also excreted in breast milk and breast feeding isn't recommended. Birth Control Pills Pregnancy And Lactation Text: This medication should be avoided if pregnant and for the first 30 days post-partum. Minoxidil Pregnancy And Lactation Text: This medication has not been assigned a Pregnancy Risk Category but animal studies failed to show danger with the topical medication. It is unknown if the medication is excreted in breast milk. Clindamycin Counseling: I counseled the patient regarding use of clindamycin as an antibiotic for prophylactic and/or therapeutic purposes. Clindamycin is active against numerous classes of bacteria, including skin bacteria. Side effects may include nausea, diarrhea, gastrointestinal upset, rash, hives, yeast infections, and in rare cases, colitis. Winlevi Counseling:  I discussed with the patient the risks of topical clascoterone including but not limited to erythema, scaling, itching, and stinging. Patient voiced their understanding. Cantharidin Pregnancy And Lactation Text: The use of this medication during pregnancy or lactation is not recommended as there is insufficient data. Infliximab Counseling:  I discussed with the patient the risks of infliximab including but not limited to myelosuppression, immunosuppression, autoimmune hepatitis, demyelinating diseases, lymphoma, and serious infections.  The patient understands that monitoring is required including a PPD at baseline and must alert us or the primary physician if symptoms of infection or other concerning signs are noted. Cyclosporine Counseling:  I discussed with the patient the risks of cyclosporine including but not limited to hypertension, gingival hyperplasia,myelosuppression, immunosuppression, liver damage, kidney damage, neurotoxicity, lymphoma, and serious infections. The patient understands that monitoring is required including baseline blood pressure, CBC, CMP, lipid panel and uric acid, and then 1-2 times monthly CMP and blood pressure. Azelaic Acid Counseling: Patient counseled that medicine may cause skin irritation and to avoid applying near the eyes.  In the event of skin irritation, the patient was advised to reduce the amount of the drug applied or use it less frequently.   The patient verbalized understanding of the proper use and possible adverse effects of azelaic acid.  All of the patient's questions and concerns were addressed. Qbrexza Pregnancy And Lactation Text: There is no available data on Qbrexza use in pregnant women.  There is no available data on Qbrexza use in lactation. Albendazole Counseling:  I discussed with the patient the risks of albendazole including but not limited to cytopenia, kidney damage, nausea/vomiting and severe allergy.  The patient understands that this medication is being used in an off-label manner. Gabapentin Counseling: I discussed with the patient the risks of gabapentin including but not limited to dizziness, somnolence, fatigue and ataxia. Propranolol Pregnancy And Lactation Text: This medication is Pregnancy Category C and it isn't known if it is safe during pregnancy. It is excreted in breast milk. Topical Clindamycin Counseling: Patient counseled that this medication may cause skin irritation or allergic reactions.  In the event of skin irritation, the patient was advised to reduce the amount of the drug applied or use it less frequently.   The patient verbalized understanding of the proper use and possible adverse effects of clindamycin.  All of the patient's questions and concerns were addressed. Bexarotene Counseling:  I discussed with the patient the risks of bexarotene including but not limited to hair loss, dry lips/skin/eyes, liver abnormalities, hyperlipidemia, pancreatitis, depression/suicidal ideation, photosensitivity, drug rash/allergic reactions, hypothyroidism, anemia, leukopenia, infection, cataracts, and teratogenicity.  Patient understands that they will need regular blood tests to check lipid profile, liver function tests, white blood cell count, thyroid function tests and pregnancy test if applicable. Olumiant Pregnancy And Lactation Text: Based on animal studies, Olumiant may cause embryo-fetal harm when administered to pregnant women.  The medication should not be used in pregnancy.  Breastfeeding is not recommended during treatment. Clindamycin Pregnancy And Lactation Text: This medication can be used in pregnancy if certain situations. Clindamycin is also present in breast milk. 5-Fu Counseling: 5-Fluorouracil Counseling:  I discussed with the patient the risks of 5-fluorouracil including but not limited to erythema, scaling, itching, weeping, crusting, and pain. Minocycline Counseling: Patient advised regarding possible photosensitivity and discoloration of the teeth, skin, lips, tongue and gums.  Patient instructed to avoid sunlight, if possible.  When exposed to sunlight, patients should wear protective clothing, sunglasses, and sunscreen.  The patient was instructed to call the office immediately if the following severe adverse effects occur:  hearing changes, easy bruising/bleeding, severe headache, or vision changes.  The patient verbalized understanding of the proper use and possible adverse effects of minocycline.  All of the patient's questions and concerns were addressed. Winlevi Pregnancy And Lactation Text: This medication is considered safe during pregnancy and breastfeeding. Olanzapine Counseling- I discussed with the patient the common side effects of olanzapine including but are not limited to: lack of energy, dry mouth, increased appetite, sleepiness, tremor, constipation, dizziness, changes in behavior, or restlessness.  Explained that teenagers are more likely to experience headaches, abdominal pain, pain in the arms or legs, tiredness, and sleepiness.  Serious side effects include but are not limited: increased risk of death in elderly patients who are confused, have memory loss, or dementia-related psychosis; hyperglycemia; increased cholesterol and triglycerides; and weight gain. Libtayo Pregnancy And Lactation Text: This medication is contraindicated in pregnancy and when breast feeding. Cosentyx Pregnancy And Lactation Text: This medication is Pregnancy Category B and is considered safe during pregnancy. It is unknown if this medication is excreted in breast milk. Tetracycline Pregnancy And Lactation Text: This medication is Pregnancy Category D and not consider safe during pregnancy. It is also excreted in breast milk. Mirvaso Counseling: Mirvaso is a topical medication which can decrease superficial blood flow where applied. Side effects are uncommon and include stinging, redness and allergic reactions. Terbinafine Counseling: Patient counseling regarding adverse effects of terbinafine including but not limited to headache, diarrhea, rash, upset stomach, liver function test abnormalities, itching, taste/smell disturbance, nausea, abdominal pain, and flatulence.  There is a rare possibility of liver failure that can occur when taking terbinafine.  The patient understands that a baseline LFT and kidney function test may be required. The patient verbalized understanding of the proper use and possible adverse effects of terbinafine.  All of the patient's questions and concerns were addressed. Topical Clindamycin Pregnancy And Lactation Text: This medication is Pregnancy Category B and is considered safe during pregnancy. It is unknown if it is excreted in breast milk. Spironolactone Counseling: Patient advised regarding risks of diarrhea, abdominal pain, hyperkalemia, birth defects (for female patients), liver toxicity and renal toxicity. The patient may need blood work to monitor liver and kidney function and potassium levels while on therapy. The patient verbalized understanding of the proper use and possible adverse effects of spironolactone.  All of the patient's questions and concerns were addressed. SSKI Counseling:  I discussed with the patient the risks of SSKI including but not limited to thyroid abnormalities, metallic taste, GI upset, fever, headache, acne, arthralgias, paraesthesias, lymphadenopathy, easy bleeding, arrhythmias, and allergic reaction. Stelara Counseling:  I discussed with the patient the risks of ustekinumab including but not limited to immunosuppression, malignancy, posterior leukoencephalopathy syndrome, and serious infections.  The patient understands that monitoring is required including a PPD at baseline and must alert us or the primary physician if symptoms of infection or other concerning signs are noted. aching Rinvoq Counseling: I discussed with the patient the risks of Rinvoq therapy including but not limited to upper respiratory tract infections, shingles, cold sores, bronchitis, nausea, cough, fever, acne, and headache. Live vaccines should be avoided.  This medication has been linked to serious infections; higher rate of mortality; malignancy and lymphoproliferative disorders; major adverse cardiovascular events; thrombosis; thrombocytopenia, anemia, and neutropenia; lipid elevations; liver enzyme elevations; and gastrointestinal perforations. Cyclosporine Pregnancy And Lactation Text: This medication is Pregnancy Category C and it isn't know if it is safe during pregnancy. This medication is excreted in breast milk. Azelaic Acid Pregnancy And Lactation Text: This medication is considered safe during pregnancy and breast feeding. Rhofade Counseling: Rhofade is a topical medication which can decrease superficial blood flow where applied. Side effects are uncommon and include stinging, redness and allergic reactions. Gabapentin Pregnancy And Lactation Text: This medication is Pregnancy Category C and isn't considered safe during pregnancy. It is excreted in breast milk. Bexarotene Pregnancy And Lactation Text: This medication is Pregnancy Category X and should not be given to women who are pregnant or may become pregnant. This medication should not be used if you are breast feeding. Olanzapine Pregnancy And Lactation Text: This medication is pregnancy category C.   There are no adequate and well controlled trials with olanzapine in pregnant females.  Olanzapine should be used during pregnancy only if the potential benefit justifies the potential risk to the fetus.   In a study in lactating healthy women, olanzapine was excreted in breast milk.  It is recommended that women taking olanzapine should not breast feed. Terbinafine Pregnancy And Lactation Text: This medication is Pregnancy Category B and is considered safe during pregnancy. It is also excreted in breast milk and breast feeding isn't recommended. Mirvaso Pregnancy And Lactation Text: This medication has not been assigned a Pregnancy Risk Category. It is unknown if the medication is excreted in breast milk. Doxycycline Counseling:  Patient counseled regarding possible photosensitivity and increased risk for sunburn.  Patient instructed to avoid sunlight, if possible.  When exposed to sunlight, patients should wear protective clothing, sunglasses, and sunscreen.  The patient was instructed to call the office immediately if the following severe adverse effects occur:  hearing changes, easy bruising/bleeding, severe headache, or vision changes.  The patient verbalized understanding of the proper use and possible adverse effects of doxycycline.  All of the patient's questions and concerns were addressed. Albendazole Pregnancy And Lactation Text: This medication is Pregnancy Category C and it isn't known if it is safe during pregnancy. It is also excreted in breast milk. Arava Counseling:  Patient counseled regarding adverse effects of Arava including but not limited to nausea, vomiting, abnormalities in liver function tests. Patients may develop mouth sores, rash, diarrhea, and abnormalities in blood counts. The patient understands that monitoring is required including LFTs and blood counts.  There is a rare possibility of scarring of the liver and lung problems that can occur when taking methotrexate. Persistent nausea, loss of appetite, pale stools, dark urine, cough, and shortness of breath should be reported immediately. Patient advised to discontinue Arava treatment and consult with a physician prior to attempting conception. The patient will have to undergo a treatment to eliminate Arava from the body prior to conception. Dupixent Counseling: I discussed with the patient the risks of dupilumab including but not limited to eye infection and irritation, cold sores, injection site reactions, worsening of asthma, allergic reactions and increased risk of parasitic infection.  Live vaccines should be avoided while taking dupilumab. Dupilumab will also interact with certain medications such as warfarin and cyclosporine. The patient understands that monitoring is required and they must alert us or the primary physician if symptoms of infection or other concerning signs are noted. Odomzo Counseling- I discussed with the patient the risks of Odomzo including but not limited to nausea, vomiting, diarrhea, constipation, weight loss, changes in the sense of taste, decreased appetite, muscle spasms, and hair loss.  The patient verbalized understanding of the proper use and possible adverse effects of Odomzo.  All of the patient's questions and concerns were addressed. Topical Ketoconazole Counseling: Patient counseled that this medication may cause skin irritation or allergic reactions.  In the event of skin irritation, the patient was advised to reduce the amount of the drug applied or use it less frequently.   The patient verbalized understanding of the proper use and possible adverse effects of ketoconazole.  All of the patient's questions and concerns were addressed. VTAMA Counseling: I discussed with the patient that VTAMA is not for use in the eyes, mouth or mouth. They should call the office if they develop any signs of allergic reactions to VTAMA. The patient verbalized understanding of the proper use and possible adverse effects of VTAMA.  All of the patient's questions and concerns were addressed. Spironolactone Pregnancy And Lactation Text: This medication can cause feminization of the male fetus and should be avoided during pregnancy. The active metabolite is also found in breast milk. Sski Pregnancy And Lactation Text: This medication is Pregnancy Category D and isn't considered safe during pregnancy. It is excreted in breast milk. Fluconazole Counseling:  Patient counseled regarding adverse effects of fluconazole including but not limited to headache, diarrhea, nausea, upset stomach, liver function test abnormalities, taste disturbance, and stomach pain.  There is a rare possibility of liver failure that can occur when taking fluconazole.  The patient understands that monitoring of LFTs and kidney function test may be required, especially at baseline. The patient verbalized understanding of the proper use and possible adverse effects of fluconazole.  All of the patient's questions and concerns were addressed. Hydroquinone Counseling:  Patient advised that medication may result in skin irritation, lightening (hypopigmentation), dryness, and burning.  In the event of skin irritation, the patient was advised to reduce the amount of the drug applied or use it less frequently.  Rarely, spots that are treated with hydroquinone can become darker (pseudoochronosis).  Should this occur, patient instructed to stop medication and call the office. The patient verbalized understanding of the proper use and possible adverse effects of hydroquinone.  All of the patient's questions and concerns were addressed. Rinvoq Pregnancy And Lactation Text: Based on animal studies, Rinvoq may cause embryo-fetal harm when administered to pregnant women.  The medication should not be used in pregnancy.  Breastfeeding is not recommended during treatment and for 6 days after the last dose. Glycopyrrolate Counseling:  I discussed with the patient the risks of glycopyrrolate including but not limited to skin rash, drowsiness, dry mouth, difficulty urinating, and blurred vision. Benzoyl Peroxide Counseling: Patient counseled that medicine may cause skin irritation and bleach clothing.  In the event of skin irritation, the patient was advised to reduce the amount of the drug applied or use it less frequently.   The patient verbalized understanding of the proper use and possible adverse effects of benzoyl peroxide.  All of the patient's questions and concerns were addressed. Methotrexate Counseling:  Patient counseled regarding adverse effects of methotrexate including but not limited to nausea, vomiting, abnormalities in liver function tests. Patients may develop mouth sores, rash, diarrhea, and abnormalities in blood counts. The patient understands that monitoring is required including LFT's and blood counts.  There is a rare possibility of scarring of the liver and lung problems that can occur when taking methotrexate. Persistent nausea, loss of appetite, pale stools, dark urine, cough, and shortness of breath should be reported immediately. Patient advised to discontinue methotrexate treatment at least three months before attempting to become pregnant.  I discussed the need for folate supplements while taking methotrexate.  These supplements can decrease side effects during methotrexate treatment. The patient verbalized understanding of the proper use and possible adverse effects of methotrexate.  All of the patient's questions and concerns were addressed. Dupixent Pregnancy And Lactation Text: This medication likely crosses the placenta but the risk for the fetus is uncertain. This medication is excreted in breast milk. Opzelura Counseling:  I discussed with the patient the risks of Opzelura including but not limited to nasopharngitis, bronchitis, ear infection, eosinophila, hives, diarrhea, folliculitis, tonsillitis, and rhinorrhea.  Taken orally, this medication has been linked to serious infections; higher rate of mortality; malignancy and lymphoproliferative disorders; major adverse cardiovascular events; thrombosis; thrombocytopenia, anemia, and neutropenia; and lipid elevations. Isotretinoin Counseling: Patient should get monthly blood tests, not donate blood, not drive at night if vision affected, not share medication, and not undergo elective surgery for 6 months after tx completed. Side effects reviewed, pt to contact office should one occur. Rituxan Counseling:  I discussed with the patient the risks of Rituxan infusions. Side effects can include infusion reactions, severe drug rashes including mucocutaneous reactions, reactivation of latent hepatitis and other infections and rarely progressive multifocal leukoencephalopathy.  All of the patient's questions and concerns were addressed. Ivermectin Counseling:  Patient instructed to take medication on an empty stomach with a full glass of water.  Patient informed of potential adverse effects including but not limited to nausea, diarrhea, dizziness, itching, and swelling of the extremities or lymph nodes.  The patient verbalized understanding of the proper use and possible adverse effects of ivermectin.  All of the patient's questions and concerns were addressed. Oral Minoxidil Counseling- I discussed with the patient the risks of oral minoxidil including but not limited to shortness of breath, swelling of the feet or ankles, dizziness, lightheadedness, unwanted hair growth and allergic reaction.  The patient verbalized understanding of the proper use and possible adverse effects of oral minoxidil.  All of the patient's questions and concerns were addressed. Taltz Counseling: I discussed with the patient the risks of ixekizumab including but not limited to immunosuppression, serious infections, worsening of inflammatory bowel disease and drug reactions.  The patient understands that monitoring is required including a PPD at baseline and must alert us or the primary physician if symptoms of infection or other concerning signs are noted. Doxycycline Pregnancy And Lactation Text: This medication is Pregnancy Category D and not consider safe during pregnancy. It is also excreted in breast milk but is considered safe for shorter treatment courses. Vtama Pregnancy And Lactation Text: It is unknown if this medication can cause problems during pregnancy and breastfeeding. Quinolones Counseling:  I discussed with the patient the risks of fluoroquinolones including but not limited to GI upset, allergic reaction, drug rash, diarrhea, dizziness, photosensitivity, yeast infections, liver function test abnormalities, tendonitis/tendon rupture. Azithromycin Counseling:  I discussed with the patient the risks of azithromycin including but not limited to GI upset, allergic reaction, drug rash, diarrhea, and yeast infections. Cimetidine Counseling:  I discussed with the patient the risks of Cimetidine including but not limited to gynecomastia, headache, diarrhea, nausea, drowsiness, arrhythmias, pancreatitis, skin rashes, psychosis, bone marrow suppression and kidney toxicity. Glycopyrrolate Pregnancy And Lactation Text: This medication is Pregnancy Category B and is considered safe during pregnancy. It is unknown if it is excreted breast milk. Benzoyl Peroxide Pregnancy And Lactation Text: This medication is Pregnancy Category C. It is unknown if benzoyl peroxide is excreted in breast milk. Drysol Counseling:  I discussed with the patient the risks of drysol/aluminum chloride including but not limited to skin rash, itching, irritation, burning. Methotrexate Pregnancy And Lactation Text: This medication is Pregnancy Category X and is known to cause fetal harm. This medication is excreted in breast milk. Fluconazole Pregnancy And Lactation Text: This medication is Pregnancy Category C and it isn't know if it is safe during pregnancy. It is also excreted in breast milk. Solaraze Counseling:  I discussed with the patient the risks of Solaraze including but not limited to erythema, scaling, itching, weeping, crusting, and pain. Thalidomide Counseling: I discussed with the patient the risks of thalidomide including but not limited to birth defects, anxiety, weakness, chest pain, dizziness, cough and severe allergy. Enbrel Counseling:  I discussed with the patient the risks of etanercept including but not limited to myelosuppression, immunosuppression, autoimmune hepatitis, demyelinating diseases, lymphoma, and infections.  The patient understands that monitoring is required including a PPD at baseline and must alert us or the primary physician if symptoms of infection or other concerning signs are noted. Sotyktu Counseling:  I discussed the most common side effects of Sotyktu including: common cold, sore throat, sinus infections, cold sores, canker sores, folliculitis, and acne.  I also discussed more serious side effects of Sotyktu including but not limited to: serious allergic reactions; increased risk for infections such as TB; cancers such as lymphomas; rhabdomyolysis and elevated CPK; and elevated triglycerides and liver enzymes.  Rituxan Pregnancy And Lactation Text: This medication is Pregnancy Category C and it isn't know if it is safe during pregnancy. It is unknown if this medication is excreted in breast milk but similar antibodies are known to be excreted. Azathioprine Counseling:  I discussed with the patient the risks of azathioprine including but not limited to myelosuppression, immunosuppression, hepatotoxicity, lymphoma, and infections.  The patient understands that monitoring is required including baseline LFTs, Creatinine, possible TPMP genotyping and weekly CBCs for the first month and then every 2 weeks thereafter.  The patient verbalized understanding of the proper use and possible adverse effects of azathioprine.  All of the patient's questions and concerns were addressed. Oral Minoxidil Pregnancy And Lactation Text: This medication should only be used when clearly needed if you are pregnant, attempting to become pregnant or breast feeding. Isotretinoin Pregnancy And Lactation Text: This medication is Pregnancy Category X and is considered extremely dangerous during pregnancy. It is unknown if it is excreted in breast milk. Clofazimine Counseling:  I discussed with the patient the risks of clofazimine including but not limited to skin and eye pigmentation, liver damage, nausea/vomiting, gastrointestinal bleeding and allergy. Opzelura Pregnancy And Lactation Text: There is insufficient data to evaluate drug-associated risk for major birth defects, miscarriage, or other adverse maternal or fetal outcomes.  There is a pregnancy registry that monitors pregnancy outcomes in pregnant persons exposed to the medication during pregnancy.  It is unknown if this medication is excreted in breast milk.  Do not breastfeed during treatment and for about 4 weeks after the last dose. Griseofulvin Counseling:  I discussed with the patient the risks of griseofulvin including but not limited to photosensitivity, cytopenia, liver damage, nausea/vomiting and severe allergy.  The patient understands that this medication is best absorbed when taken with a fatty meal (e.g., ice cream or french fries). Topical Sulfur Applications Counseling: Topical Sulfur Counseling: Patient counseled that this medication may cause skin irritation or allergic reactions.  In the event of skin irritation, the patient was advised to reduce the amount of the drug applied or use it less frequently.   The patient verbalized understanding of the proper use and possible adverse effects of topical sulfur application.  All of the patient's questions and concerns were addressed. Zoryve Counseling:  I discussed with the patient that Zoryve is not for use in the eyes, mouth or vagina. The most commonly reported side effects include diarrhea, headache, insomnia, application site pain, upper respiratory tract infections, and urinary tract infections.  All of the patient's questions and concerns were addressed. Imiquimod Counseling:  I discussed with the patient the risks of imiquimod including but not limited to erythema, scaling, itching, weeping, crusting, and pain.  Patient understands that the inflammatory response to imiquimod is variable from person to person and was educated regarded proper titration schedule.  If flu-like symptoms develop, patient knows to discontinue the medication and contact us. Azithromycin Pregnancy And Lactation Text: This medication is considered safe during pregnancy and is also secreted in breast milk. Erythromycin Counseling:  I discussed with the patient the risks of erythromycin including but not limited to GI upset, allergic reaction, drug rash, diarrhea, increase in liver enzymes, and yeast infections. Siliq Counseling:  I discussed with the patient the risks of Siliq including but not limited to new or worsening depression, suicidal thoughts and behavior, immunosuppression, malignancy, posterior leukoencephalopathy syndrome, and serious infections.  The patient understands that monitoring is required including a PPD at baseline and must alert us or the primary physician if symptoms of infection or other concerning signs are noted. There is also a special program designed to monitor depression which is required with Siliq. Sotyktu Pregnancy And Lactation Text: There is insufficient data to evaluate whether or not Sotyktu is safe to use during pregnancy.   It is not known if Sotyktu passes into breast milk and whether or not it is safe to use when breastfeeding.   Solaraze Pregnancy And Lactation Text: This medication is Pregnancy Category B and is considered safe. There is some data to suggest avoiding during the third trimester. It is unknown if this medication is excreted in breast milk. Prednisone Counseling:  I discussed with the patient the risks of prolonged use of prednisone including but not limited to weight gain, insomnia, osteoporosis, mood changes, diabetes, susceptibility to infection, glaucoma and high blood pressure.  In cases where prednisone use is prolonged, patients should be monitored with blood pressure checks, serum glucose levels and an eye exam.  Additionally, the patient may need to be placed on GI prophylaxis, PCP prophylaxis, and calcium and vitamin D supplementation and/or a bisphosphonate.  The patient verbalized understanding of the proper use and the possible adverse effects of prednisone.  All of the patient's questions and concerns were addressed. Carac Counseling:  I discussed with the patient the risks of Carac including but not limited to erythema, scaling, itching, weeping, crusting, and pain. Hydroxychloroquine Counseling:  I discussed with the patient that a baseline ophthalmologic exam is needed at the start of therapy and every year thereafter while on therapy. A CBC may also be warranted for monitoring.  The side effects of this medication were discussed with the patient, including but not limited to agranulocytosis, aplastic anemia, seizures, rashes, retinopathy, and liver toxicity. Patient instructed to call the office should any adverse effect occur.  The patient verbalized understanding of the proper use and possible adverse effects of Plaquenil.  All the patient's questions and concerns were addressed. Dutasteride Counseling: Dustasteride Counseling:  I discussed with the patient the risks of use of dutasteride including but not limited to decreased libido, decreased ejaculate volume, and gynecomastia. Women who can become pregnant should not handle medication.  All of the patient's questions and concerns were addressed. Picato Counseling:  I discussed with the patient the risks of Picato including but not limited to erythema, scaling, itching, weeping, crusting, and pain. Otezla Counseling: The side effects of Otezla were discussed with the patient, including but not limited to worsening or new depression, weight loss, diarrhea, nausea, upper respiratory tract infection, and headache. Patient instructed to call the office should any adverse effect occur.  The patient verbalized understanding of the proper use and possible adverse effects of Otezla.  All the patient's questions and concerns were addressed. High Dose Vitamin A Counseling: Side effects reviewed, pt to contact office should one occur. Azathioprine Pregnancy And Lactation Text: This medication is Pregnancy Category D and isn't considered safe during pregnancy. It is unknown if this medication is excreted in breast milk. Erythromycin Pregnancy And Lactation Text: This medication is Pregnancy Category B and is considered safe during pregnancy. It is also excreted in breast milk. Adbry Counseling: I discussed with the patient the risks of tralokinumab including but not limited to eye infection and irritation, cold sores, injection site reactions, worsening of asthma, allergic reactions and increased risk of parasitic infection.  Live vaccines should be avoided while taking tralokinumab. The patient understands that monitoring is required and they must alert us or the primary physician if symptoms of infection or other concerning signs are noted. Tremfya Counseling: I discussed with the patient the risks of guselkumab including but not limited to immunosuppression, serious infections, and drug reactions.  The patient understands that monitoring is required including a PPD at baseline and must alert us or the primary physician if symptoms of infection or other concerning signs are noted. Elidel Counseling: Patient may experience a mild burning sensation during topical application. Elidel is not approved in children less than 2 years of age. There have been case reports of hematologic and skin malignancies in patients using topical calcineurin inhibitors although causality is questionable. Rifampin Counseling: I discussed with the patient the risks of rifampin including but not limited to liver damage, kidney damage, red-orange body fluids, nausea/vomiting and severe allergy. Topical Sulfur Applications Pregnancy And Lactation Text: This medication is considered safe during pregnancy and breast feeding secondary to limited systemic absorption. Griseofulvin Pregnancy And Lactation Text: This medication is Pregnancy Category X and is known to cause serious birth defects. It is unknown if this medication is excreted in breast milk but breast feeding should be avoided. Bactrim Counseling:  I discussed with the patient the risks of sulfa antibiotics including but not limited to GI upset, allergic reaction, drug rash, diarrhea, dizziness, photosensitivity, and yeast infections.  Rarely, more serious reactions can occur including but not limited to aplastic anemia, agranulocytosis, methemoglobinemia, blood dyscrasias, liver or kidney failure, lung infiltrates or desquamative/blistering drug rashes. Dutasteride Pregnancy And Lactation Text: This medication is absolutely contraindicated in women, especially during pregnancy and breast feeding. Feminization of male fetuses is possible if taking while pregnant. Colchicine Counseling:  Patient counseled regarding adverse effects including but not limited to stomach upset (nausea, vomiting, stomach pain, or diarrhea).  Patient instructed to limit alcohol consumption while taking this medication.  Colchicine may reduce blood counts especially with prolonged use.  The patient understands that monitoring of kidney function and blood counts may be required, especially at baseline. The patient verbalized understanding of the proper use and possible adverse effects of colchicine.  All of the patient's questions and concerns were addressed. High Dose Vitamin A Pregnancy And Lactation Text: High dose vitamin A therapy is contraindicated during pregnancy and breast feeding. Tranexamic Acid Counseling:  Patient advised of the small risk of bleeding problems with tranexamic acid. They were also instructed to call if they developed any nausea, vomiting or diarrhea. All of the patient's questions and concerns were addressed. Cellcept Counseling:  I discussed with the patient the risks of mycophenolate mofetil including but not limited to infection/immunosuppression, GI upset, hypokalemia, hypercholesterolemia, bone marrow suppression, lymphoproliferative disorders, malignancy, GI ulceration/bleed/perforation, colitis, interstitial lung disease, kidney failure, progressive multifocal leukoencephalopathy, and birth defects.  The patient understands that monitoring is required including a baseline creatinine and regular CBC testing. In addition, patient must alert us immediately if symptoms of infection or other concerning signs are noted. Doxepin Counseling:  Patient advised that the medication is sedating and not to drive a car after taking this medication. Patient informed of potential adverse effects including but not limited to dry mouth, urinary retention, and blurry vision.  The patient verbalized understanding of the proper use and possible adverse effects of doxepin.  All of the patient's questions and concerns were addressed. Otezla Pregnancy And Lactation Text: This medication is Pregnancy Category C and it isn't known if it is safe during pregnancy. It is unknown if it is excreted in breast milk. Hydroxychloroquine Pregnancy And Lactation Text: This medication has been shown to cause fetal harm but it isn't assigned a Pregnancy Risk Category. There are small amounts excreted in breast milk. Topical Retinoid counseling:  Patient advised to apply a pea-sized amount only at bedtime and wait 30 minutes after washing their face before applying.  If too drying, patient may add a non-comedogenic moisturizer. The patient verbalized understanding of the proper use and possible adverse effects of retinoids.  All of the patient's questions and concerns were addressed. Xeljanz Counseling: I discussed with the patient the risks of Xeljanz therapy including increased risk of infection, liver issues, headache, diarrhea, or cold symptoms. Live vaccines should be avoided. They were instructed to call if they have any problems. Metronidazole Counseling:  I discussed with the patient the risks of metronidazole including but not limited to seizures, nausea/vomiting, a metallic taste in the mouth, nausea/vomiting and severe allergy. Adbry Pregnancy And Lactation Text: It is unknown if this medication will adversely affect pregnancy or breast feeding. Humira Counseling:  I discussed with the patient the risks of adalimumab including but not limited to myelosuppression, immunosuppression, autoimmune hepatitis, demyelinating diseases, lymphoma, and serious infections.  The patient understands that monitoring is required including a PPD at baseline and must alert us or the primary physician if symptoms of infection or other concerning signs are noted. Niacinamide Counseling: I recommended taking niacin or niacinamide, also know as vitamin B3, twice daily. Recent evidence suggests that taking vitamin B3 (500 mg twice daily) can reduce the risk of actinic keratoses and non-melanoma skin cancers. Side effects of vitamin B3 include flushing and headache. Rifampin Pregnancy And Lactation Text: This medication is Pregnancy Category C and it isn't know if it is safe during pregnancy. It is also excreted in breast milk and should not be used if you are breast feeding. Zyclara Counseling:  I discussed with the patient the risks of imiquimod including but not limited to erythema, scaling, itching, weeping, crusting, and pain.  Patient understands that the inflammatory response to imiquimod is variable from person to person and was educated regarded proper titration schedule.  If flu-like symptoms develop, patient knows to discontinue the medication and contact us. Itraconazole Counseling:  I discussed with the patient the risks of itraconazole including but not limited to liver damage, nausea/vomiting, neuropathy, and severe allergy.  The patient understands that this medication is best absorbed when taken with acidic beverages such as non-diet cola or ginger ale.  The patient understands that monitoring is required including baseline LFTs and repeat LFTs at intervals.  The patient understands that they are to contact us or the primary physician if concerning signs are noted. Klisyri Counseling:  I discussed with the patient the risks of Klisyri including but not limited to erythema, scaling, itching, weeping, crusting, and pain. Tranexamic Acid Pregnancy And Lactation Text: It is unknown if this medication is safe during pregnancy or breast feeding. Finasteride Counseling:  I discussed with the patient the risks of use of finasteride including but not limited to decreased libido, decreased ejaculate volume, gynecomastia, and depression. Women should not handle medication.  All of the patient's questions and concerns were addressed. Xelscarletz Pregnancy And Lactation Text: This medication is Pregnancy Category D and is not considered safe during pregnancy.  The risk during breast feeding is also uncertain. Low Dose Naltrexone Counseling- I discussed with the patient the potential risks and side effects of low dose naltrexone including but not limited to: more vivid dreams, headaches, nausea, vomiting, abdominal pain, fatigue, dizziness, and anxiety. Bactrim Pregnancy And Lactation Text: This medication is Pregnancy Category D and is known to cause fetal risk.  It is also excreted in breast milk. Doxepin Pregnancy And Lactation Text: This medication is Pregnancy Category C and it isn't known if it is safe during pregnancy. It is also excreted in breast milk and breast feeding isn't recommended. Simponi Counseling:  I discussed with the patient the risks of golimumab including but not limited to myelosuppression, immunosuppression, autoimmune hepatitis, demyelinating diseases, lymphoma, and serious infections.  The patient understands that monitoring is required including a PPD at baseline and must alert us or the primary physician if symptoms of infection or other concerning signs are noted. Calcipotriene Counseling: Cantharidin Counseling:  I discussed with the patient the risks of Cantharidin including but not limited to pain, redness, burning, itching, and blistering. Cibinqo Counseling: I discussed with the patient the risks of Cibinqo therapy including but not limited to common cold, nausea, headache, cold sores, increased blood CPK levels, dizziness, UTIs, fatigue, acne, and vomitting. Live vaccines should be avoided.  This medication has been linked to serious infections; higher rate of mortality; malignancy and lymphoproliferative disorders; major adverse cardiovascular events; thrombosis; thrombocytopenia and lymphopenia; lipid elevations; and retinal detachment. Protopic Counseling: Patient may experience a mild burning sensation during topical application. Protopic is not approved in children less than 2 years of age. There have been case reports of hematologic and skin malignancies in patients using topical calcineurin inhibitors although causality is questionable. Oxybutynin Counseling:  I discussed with the patient the risks of oxybutynin including but not limited to skin rash, drowsiness, dry mouth, difficulty urinating, and blurred vision. Klisyri Pregnancy And Lactation Text: It is unknown if this medication can harm a developing fetus or if it is excreted in breast milk. Niacinamide Pregnancy And Lactation Text: These medications are considered safe during pregnancy. Xolair Counseling:  Patient informed of potential adverse effects including but not limited to fever, muscle aches, rash and allergic reactions.  The patient verbalized understanding of the proper use and possible adverse effects of Xolair.  All of the patient's questions and concerns were addressed. Metronidazole Pregnancy And Lactation Text: This medication is Pregnancy Category B and considered safe during pregnancy.  It is also excreted in breast milk. Cimzia Counseling:  I discussed with the patient the risks of Cimzia including but not limited to immunosuppression, allergic reactions and infections.  The patient understands that monitoring is required including a PPD at baseline and must alert us or the primary physician if symptoms of infection or other concerning signs are noted. Sarecycline Counseling: Patient advised regarding possible photosensitivity and discoloration of the teeth, skin, lips, tongue and gums.  Patient instructed to avoid sunlight, if possible.  When exposed to sunlight, patients should wear protective clothing, sunglasses, and sunscreen.  The patient was instructed to call the office immediately if the following severe adverse effects occur:  hearing changes, easy bruising/bleeding, severe headache, or vision changes.  The patient verbalized understanding of the proper use and possible adverse effects of sarecycline.  All of the patient's questions and concerns were addressed. Cephalexin Counseling: I counseled the patient regarding use of cephalexin as an antibiotic for prophylactic and/or therapeutic purposes. Cephalexin (commonly prescribed under brand name Keflex) is a cephalosporin antibiotic which is active against numerous classes of bacteria, including most skin bacteria. Side effects may include nausea, diarrhea, gastrointestinal upset, rash, hives, yeast infections, and in rare cases, hepatitis, kidney disease, seizures, fever, confusion, neurologic symptoms, and others. Patients with severe allergies to penicillin medications are cautioned that there is about a 10% incidence of cross-reactivity with cephalosporins. When possible, patients with penicillin allergies should use alternatives to cephalosporins for antibiotic therapy. Hydroxyzine Counseling: Patient advised that the medication is sedating and not to drive a car after taking this medication.  Patient informed of potential adverse effects including but not limited to dry mouth, urinary retention, and blurry vision.  The patient verbalized understanding of the proper use and possible adverse effects of hydroxyzine.  All of the patient's questions and concerns were addressed. Tazorac Counseling:  Patient advised that medication is irritating and drying.  Patient may need to apply sparingly and wash off after an hour before eventually leaving it on overnight.  The patient verbalized understanding of the proper use and possible adverse effects of tazorac.  All of the patient's questions and concerns were addressed. Calcipotriene Pregnancy And Lactation Text: This medication has not been proven safe during pregnancy. It is unknown if this medication is excreted in breast milk. Erivedge Counseling- I discussed with the patient the risks of Erivedge including but not limited to nausea, vomiting, diarrhea, constipation, weight loss, changes in the sense of taste, decreased appetite, muscle spasms, and hair loss.  The patient verbalized understanding of the proper use and possible adverse effects of Erivedge.  All of the patient's questions and concerns were addressed. Wartpeel Counseling:  I discussed with the patient the risks of Wartpeel including but not limited to erythema, scaling, itching, weeping, crusting, and pain. Low Dose Naltrexone Pregnancy And Lactation Text: Naltrexone is pregnancy category C.  There have been no adequate and well-controlled studies in pregnant women.  It should be used in pregnancy only if the potential benefit justifies the potential risk to the fetus.   Limited data indicates that naltrexone is minimally excreted into breastmilk. Valtrex Counseling: I discussed with the patient the risks of valacyclovir including but not limited to kidney damage, nausea, vomiting and severe allergy.  The patient understands that if the infection seems to be worsening or is not improving, they are to call. Finasteride Pregnancy And Lactation Text: This medication is absolutely contraindicated during pregnancy. It is unknown if it is excreted in breast milk. Ilumya Counseling: I discussed with the patient the risks of tildrakizumab including but not limited to immunosuppression, malignancy, posterior leukoencephalopathy syndrome, and serious infections.  The patient understands that monitoring is required including a PPD at baseline and must alert us or the primary physician if symptoms of infection or other concerning signs are noted. Cyclophosphamide Counseling:  I discussed with the patient the risks of cyclophosphamide including but not limited to hair loss, hormonal abnormalities, decreased fertility, abdominal pain, diarrhea, nausea and vomiting, bone marrow suppression and infection. The patient understands that monitoring is required while taking this medication. Dapsone Counseling: I discussed with the patient the risks of dapsone including but not limited to hemolytic anemia, agranulocytosis, rashes, methemoglobinemia, kidney failure, peripheral neuropathy, headaches, GI upset, and liver toxicity.  Patients who start dapsone require monitoring including baseline LFTs and weekly CBCs for the first month, then every month thereafter.  The patient verbalized understanding of the proper use and possible adverse effects of dapsone.  All of the patient's questions and concerns were addressed. Aklief counseling:  Patient advised to apply a pea-sized amount only at bedtime and wait 30 minutes after washing their face before applying.  If too drying, patient may add a non-comedogenic moisturizer.  The most commonly reported side effects including irritation, redness, scaling, dryness, stinging, burning, itching, and increased risk of sunburn.  The patient verbalized understanding of the proper use and possible adverse effects of retinoids.  All of the patient's questions and concerns were addressed. Acitretin Counseling:  I discussed with the patient the risks of acitretin including but not limited to hair loss, dry lips/skin/eyes, liver damage, hyperlipidemia, depression/suicidal ideation, photosensitivity.  Serious rare side effects can include but are not limited to pancreatitis, pseudotumor cerebri, bony changes, clot formation/stroke/heart attack.  Patient understands that alcohol is contraindicated since it can result in liver toxicity and significantly prolong the elimination of the drug by many years. Nsaids Counseling: NSAID Counseling: I discussed with the patient that NSAIDs should be taken with food. Prolonged use of NSAIDs can result in the development of stomach ulcers.  Patient advised to stop taking NSAIDs if abdominal pain occurs.  The patient verbalized understanding of the proper use and possible adverse effects of NSAIDs.  All of the patient's questions and concerns were addressed. Protopic Pregnancy And Lactation Text: This medication is Pregnancy Category C. It is unknown if this medication is excreted in breast milk when applied topically. Cibinqo Pregnancy And Lactation Text: It is unknown if this medication will adversely affect pregnancy or breast feeding.  You should not take this medication if you are currently pregnant or planning a pregnancy or while breastfeeding.

## 2023-02-08 ENCOUNTER — NON-APPOINTMENT (OUTPATIENT)
Age: 78
End: 2023-02-08

## 2023-03-02 NOTE — DISCHARGE NOTE ADULT - CARE PROVIDERS DIRECT ADDRESSES
[email or web address]

## 2023-10-25 NOTE — PROGRESS NOTE ADULT - PROBLEM/PLAN-1
1700   OPERATIVE REPORT    Name:  Irene Harding  MR#:  [id number]  :  1953  DATE OF SERVICE:  10/25/23     PREOPERATIVE DIAGNOSIS:  End-stage renal disease. POSTOPERATIVE DIAGNOSIS:  End-stage renal disease. PROCEDURE PERFORMED:    Left brachiocephalic fistula superficialization. Removal of peritoneal dialysis catheter     SURGEON:  Shantal Francis MD     SURGICAL ASSISTANT:  Vivian    ANESTHESIA:  Axillary block/MAC     COMPLICATIONS:  None. SPECIMENS REMOVED:  None. IMPLANTS:  None. ESTIMATED BLOOD LOSS:  Minimal.     INDICATIONS FOR PROCEDURE:  The patient is a 77-year-old male who previously had a brachiocephalic fistula and presents today for superficialization and peritoneal dialysis catheter removal.  The risks and benefits of surgery were discussed with the patient and she wished to proceed. DESCRIPTION OF PROCEDURE IN DETAIL:  The patient was brought into the operating room, and prepped and draped in the usual sterile fashion. An incision was made in the upper arm overlying the fistula. The brachiocephalic fistula was dissected free. Side branches were ligated on the vein side with silk ties and clips on the other side. The fistula was then brought closer to the skin and the tissue underneath was closed with interrupted Vicryl sutures. Next, the subcutaneous tissue was closed with 3-0 Vicryl followed by 4-0 Monocryl and skin glue. Next, an abdominal incision was made over the prior incision. The catheter was dissected free from the fascia and the intrabdominal portion was removed. There was foul smelling fluid in the catheter. The fascial defect was closed with an 0-vicyl suture. I was unable to dissect the subcutaneous cuff through this incision so a separate incision was done to do this. The catheter was removed in its entirety. Both incisions were copiously irrigated and closed in layers with 3-0 vicryls and skin staples.   All needle and sponge counts were correct at the end of the case. The patient tolerated the procedure well and was taken to PACU in stable condition.         Domenico Sepulveda MD DISPLAY PLAN FREE TEXT

## 2025-01-13 ENCOUNTER — EMERGENCY (EMERGENCY)
Facility: HOSPITAL | Age: 80
LOS: 1 days | Discharge: HOME | End: 2025-01-13
Attending: EMERGENCY MEDICINE
Payer: MEDICARE

## 2025-01-13 VITALS
HEART RATE: 80 BPM | TEMPERATURE: 98 F | RESPIRATION RATE: 20 BRPM | HEIGHT: 59 IN | WEIGHT: 108.03 LBS | OXYGEN SATURATION: 98 % | DIASTOLIC BLOOD PRESSURE: 90 MMHG | SYSTOLIC BLOOD PRESSURE: 150 MMHG

## 2025-01-13 DIAGNOSIS — Z90.710 ACQUIRED ABSENCE OF BOTH CERVIX AND UTERUS: Chronic | ICD-10-CM

## 2025-01-13 DIAGNOSIS — S72.009A FRACTURE OF UNSPECIFIED PART OF NECK OF UNSPECIFIED FEMUR, INITIAL ENCOUNTER FOR CLOSED FRACTURE: Chronic | ICD-10-CM

## 2025-01-13 DIAGNOSIS — Z96.641 PRESENCE OF RIGHT ARTIFICIAL HIP JOINT: Chronic | ICD-10-CM

## 2025-01-13 DIAGNOSIS — Z98.89 OTHER SPECIFIED POSTPROCEDURAL STATES: Chronic | ICD-10-CM

## 2025-01-13 DIAGNOSIS — S62.109A FRACTURE OF UNSPECIFIED CARPAL BONE, UNSPECIFIED WRIST, INITIAL ENCOUNTER FOR CLOSED FRACTURE: Chronic | ICD-10-CM

## 2025-01-13 PROCEDURE — 12001 RPR S/N/AX/GEN/TRNK 2.5CM/<: CPT

## 2025-01-13 PROCEDURE — 99284 EMERGENCY DEPT VISIT MOD MDM: CPT | Mod: 25

## 2025-01-13 PROCEDURE — 72220 X-RAY EXAM SACRUM TAILBONE: CPT | Mod: 26

## 2025-01-13 PROCEDURE — 72125 CT NECK SPINE W/O DYE: CPT | Mod: MC

## 2025-01-13 PROCEDURE — 70450 CT HEAD/BRAIN W/O DYE: CPT | Mod: 26

## 2025-01-13 PROCEDURE — 72125 CT NECK SPINE W/O DYE: CPT | Mod: 26

## 2025-01-13 PROCEDURE — 72220 X-RAY EXAM SACRUM TAILBONE: CPT

## 2025-01-13 PROCEDURE — 70450 CT HEAD/BRAIN W/O DYE: CPT | Mod: MC

## 2025-01-13 RX ORDER — CLOSTRIDIUM TETANI TOXOID ANTIGEN (FORMALDEHYDE INACTIVATED), CORYNEBACTERIUM DIPHTHERIAE TOXOID ANTIGEN (FORMALDEHYDE INACTIVATED), BORDETELLA PERTUSSIS TOXOID ANTIGEN (GLUTARALDEHYDE INACTIVATED), BORDETELLA PERTUSSIS FILAMENTOUS HEMAGGLUTININ ANTIGEN (FORMALDEHYDE INACTIVATED), BORDETELLA PERTUSSIS PERTACTIN ANTIGEN, AND BORDETELLA PERTUSSIS FIMBRIAE 2/3 ANTIGEN 5; 2; 2.5; 5; 3; 5 [LF]/.5ML; [LF]/.5ML; UG/.5ML; UG/.5ML; UG/.5ML; UG/.5ML
0.5 INJECTION, SUSPENSION INTRAMUSCULAR ONCE
Refills: 0 | Status: COMPLETED | OUTPATIENT
Start: 2025-01-13 | End: 2025-01-13

## 2025-01-13 NOTE — ED PROVIDER NOTE - PHYSICAL EXAMINATION
A&Ox3,follows command, responds appropriately  CN: II-XII : Conjugate gaze, PERRLA, Visual field full to confrontation, EOMI with out nystagmus, pursuit smooth without saccade.  Facial: Sensation and muscle activation intact bilateral. Hearing intact bilateral  Palate: Elevate symmetrically . Shoulder shrug and neck turn full strength. Tongue midline  Motor: UE and LE strength 5/5  Sensory : pin prick and temp intact and equal bilaterally. Vibration and  proprioception intact bilaterally   Reflex : Bicepts    brachiradialis  triceps petella achilles  L              2+                2+                   2+       2+       2+  R              2+                2+                  2 +       2+       2+  Cerebellar: Rapid alternating movement and regular rhythm without bradykinesia                      Finger to nose and heel-to-shin intact bilaterally without dysmetria or overshoot  Gait narrow base. No shuffling. Full hip flextion and knee flextion. Negative Romberg  No involuntary movement . No pronotor drift. No clonus.

## 2025-01-13 NOTE — ED ADULT NURSE NOTE - NSICDXPASTMEDICALHX_GEN_ALL_CORE_FT
PAST MEDICAL HISTORY:  Arthritis     Cataracts, bilateral     Diverticulosis     DVT (deep venous thrombosis), left     Hip fracture left    Hypertension     Liver enzyme elevation     Uterine cancer

## 2025-01-13 NOTE — ED ADULT NURSE NOTE - NSICDXFAMILYHX_GEN_ALL_CORE_FT
FAMILY HISTORY:  Family history of hypertension in sister  Family history of premature CAD    Sibling  Still living? Unknown  Family history of glaucoma in sister, Age at diagnosis: Age Unknown

## 2025-01-13 NOTE — ED PROCEDURE NOTE - DEPTH OF REPAIR FOR WOUND CLOSURE
[facility] Brief Operative Note    Pre-operative diagnosis: Right ureteral stone [N20.1]   Post-operative diagnosis right ureteral stone   Procedure: Procedure(s):  Cystoscopy, Right Ureteroscopy, Right Holmium Laser Lithotripsy, Right Retrograde Pyelogram, Right Ureteral Stent Exchange, Stone Basket Extraction   Surgeon: David Arreaga MD   Assistants(s): None   Estimated blood loss: 10 ml    Specimens: Right ureteral stone   Findings: Right ureteral stone lasered and basketed  Severe right hydronephrosis  6 fr x 24 cm stent placed ON string       skin

## 2025-01-13 NOTE — ED ADULT TRIAGE NOTE - CHIEF COMPLAINT QUOTE
Pt arrives to ED s/p fall landing on the back striking head on the tube- denies LOC/  blood thinners - c/o lower back pain / head pain Pt arrives to ED s/p fall landing on the back striking head on the bathroom  tub- denies LOC/  blood thinners - c/o lower back pain / head pain

## 2025-01-13 NOTE — ED ADULT NURSE NOTE - CHIEF COMPLAINT QUOTE
Pt arrives to ED s/p fall landing on the back striking head on the bathroom  tub- denies LOC/  blood thinners - c/o lower back pain / head pain

## 2025-01-13 NOTE — ED ADULT NURSE NOTE - NSICDXPASTSURGICALHX_GEN_ALL_CORE_FT
PAST SURGICAL HISTORY:  Fracture dislocation of wrist joint ORIF Left wrist - Plate (12/2015)    Hip fracture requiring operative repair ORIF left Hip - 3 screws (11/2015)    History of hip replacement, total, right     S/P hysterectomy     S/P tonsillectomy

## 2025-01-13 NOTE — ED PROVIDER NOTE - ATTENDING APP SHARED VISIT CONTRIBUTION OF CARE
Lost balance in bathroom while drying hair, falling backwards striking head against wall and edge of bathtub.  No loss of consciousness.  Able to get up on her own.  Denies headache.  Denies neck or back pain.  Denies anticoagulant use.  Physical exam: Nontoxic-appearing, no acute distress, small posterior scalp hematoma with small laceration.  CT cervical spine and head negative.  A/P: Fall from standing resulting in minor head injury.  Anticipatory guidance provided and supportive care and commended

## 2025-01-13 NOTE — ED PROVIDER NOTE - PATIENT PORTAL LINK FT
You can access the FollowMyHealth Patient Portal offered by [facility] by registering at the following website: [email or web address]. By joining [employer]’s FollowMyHealth portal, you will also be able to view your health information using other applications (apps) compatible with our system.

## 2025-01-13 NOTE — ED PROVIDER NOTE - OBJECTIVE STATEMENT
79-year-old female presents to ED status post fall.  Patient explained that after taking shower today she was dryness of next to her bed from tub when she fell backwards hitting the head on the bathroom wall and her buttocks in the tub.  Patient denies any chest pain, shortness of breath, dizziness prior to her fall patient denies any loss of consciousness and was able to get up ambulating well and came to the ED for an evaluation.  Patient denies any other issue at this time.  Patient denies any blood thinners but admits to hypertension and compliant with all-discussed hypertensive medications. Cyclophosphamide Pregnancy And Lactation Text: This medication is Pregnancy Category D and it isn't considered safe during pregnancy. This medication is excreted in breast milk.

## 2025-01-13 NOTE — ED ADULT NURSE NOTE - OBJECTIVE STATEMENT
Pt arrives to ED s/p fall landing on the back striking head on the bathroom  tub- denies LOC/  blood thinners - c/o lower back pain / head pain. Patient states she fell when she was getting out of the shower and hit her head. Patient denies any pain and is neurologically intact. MD at bedside

## 2025-01-13 NOTE — ED PROVIDER NOTE - CLINICAL SUMMARY MEDICAL DECISION MAKING FREE TEXT BOX
79-year-old female presents to ED status post fall.  Patient explained that after taking shower today she was dryness of next to her bed from tub when she fell backwards hitting the head on the bathroom wall and her buttocks in the tub.  Patient denies any chest pain, shortness of breath, dizziness prior to her fall patient denies any loss of consciousness and was able to get up ambulating well and came to the ED for an evaluation.  HEENT: Normal findings, Eyes : PERRLA, EOMI , Nares cler and Throat : WNL  Lungs: Clear B/L with good air entry  CVS: S1-S2 , with no murmurs  Abd : Normal BS, with no tenderness or organomegaly  Ext: Normal findings  fluent, lucid, goal directed speech, non-focal sensory, 5/5 motor, normal gait.

## 2025-01-13 NOTE — ED ADULT NURSE NOTE - NSFALLOOBATTEMPT_ED_ALL_ED
Per Committee and Provider, patient was sent an AFS Required Dismissal letter due to multiple missed appointments. Patient has until 7/16/22 to contact [facility] at [phone] to appeal. Patient is not dismissed until after 30 days from the letter and may still request treatment to be seen.               No

## 2025-01-13 NOTE — ED PROVIDER NOTE - NSFOLLOWUPINSTRUCTIONS_ED_ALL_ED_FT
Clean scalp with shampoo, rubbing gently to prevent opening of scalp wound.  Apply antibiotic ointment as needed.  Tylenol Extra Strength 2 tablets every 6 hours as needed for pain or headache.  Return to emergency department immediately for reevaluation if you experience severe headache or worsening neck pain.  Otherwise follow-up with your PMD later this week for reevaluation

## 2025-01-13 NOTE — ED ADULT NURSE NOTE - NSFALLRISKINTERV_ED_ALL_ED

## 2025-01-21 ENCOUNTER — EMERGENCY (EMERGENCY)
Facility: HOSPITAL | Age: 80
LOS: 1 days | Discharge: HOME | End: 2025-01-21
Attending: EMERGENCY MEDICINE
Payer: MEDICARE

## 2025-01-21 VITALS
RESPIRATION RATE: 16 BRPM | DIASTOLIC BLOOD PRESSURE: 85 MMHG | HEIGHT: 59 IN | SYSTOLIC BLOOD PRESSURE: 184 MMHG | OXYGEN SATURATION: 93 % | HEART RATE: 83 BPM | WEIGHT: 109.13 LBS | TEMPERATURE: 98 F

## 2025-01-21 DIAGNOSIS — S72.009A FRACTURE OF UNSPECIFIED PART OF NECK OF UNSPECIFIED FEMUR, INITIAL ENCOUNTER FOR CLOSED FRACTURE: Chronic | ICD-10-CM

## 2025-01-21 DIAGNOSIS — Z90.710 ACQUIRED ABSENCE OF BOTH CERVIX AND UTERUS: Chronic | ICD-10-CM

## 2025-01-21 DIAGNOSIS — Z96.641 PRESENCE OF RIGHT ARTIFICIAL HIP JOINT: Chronic | ICD-10-CM

## 2025-01-21 DIAGNOSIS — Z98.89 OTHER SPECIFIED POSTPROCEDURAL STATES: Chronic | ICD-10-CM

## 2025-01-21 DIAGNOSIS — S62.109A FRACTURE OF UNSPECIFIED CARPAL BONE, UNSPECIFIED WRIST, INITIAL ENCOUNTER FOR CLOSED FRACTURE: Chronic | ICD-10-CM

## 2025-01-21 PROCEDURE — G0463: CPT

## 2025-01-21 PROCEDURE — L9995: CPT

## 2025-01-21 NOTE — ED PROVIDER NOTE - OBJECTIVE STATEMENT
78yo F presenting to ED for staple removal. Had posterior scalp laceration repaired with 2 staples in ED 8 days ago. States area healed well, no complications. Has been washing area daily. Denies erythema, purulent drainage, dehiscence, headache.

## 2025-01-21 NOTE — ED PROVIDER NOTE - PATIENT PORTAL LINK FT
You can access the FollowMyHealth Patient Portal offered by [facility] by registering at the following website: [email or web address]. By joining [email or web address]’s FollowMyHealth portal, you will also be able to view your health information using other applications (apps) compatible with our system.

## 2025-01-21 NOTE — ED PROVIDER NOTE - CLINICAL SUMMARY MEDICAL DECISION MAKING FREE TEXT BOX
80yo F presenting to ED for staple removal. Had posterior scalp laceration repaired with 2 staples in ED 8 days ago. States area healed well, no complications. Has been washing area daily. No evidence of infection. Laceration well healed. staples removed. pt educated on continued wound care

## 2025-01-21 NOTE — ED PROVIDER NOTE - PHYSICAL EXAMINATION
Gen: No acute distress, non toxic  Head: NCAT  Eyes: pink conjunctivae, EOMI, PERRL  Neuro: A&O x 3, sensorimotor intact without deficits   MSK: No spine or joint tenderness to palpation, Full ROM ext x 4  Skin: +healed laceration to posterior scalp with 2 staples in place. no erythema, no dehiscence.

## 2025-01-21 NOTE — ED PROCEDURE NOTE - CPROC ED LOCAL ANESTHESIA1
Refill request for allopurinol No protocol, cannot approve med. LOV 8/21/2017  8/18/2017 #30 w/ 0. no anesthesia administered

## 2025-01-21 NOTE — ED PROVIDER NOTE - ATTENDING APP SHARED VISIT CONTRIBUTION OF CARE
I performed a history and physical exam of the patient and discussed their management with the advanced care provider. I reviewed the advanced care provider's note and agree with the documented findings and plan of care. My medical decision making and objective findings are found below.      Patient here for suture removal, 2 staples were removed by fernando Denton for DC.

## 2025-02-10 ENCOUNTER — APPOINTMENT (OUTPATIENT)
Dept: VASCULAR SURGERY | Facility: CLINIC | Age: 80
End: 2025-02-10
Payer: MEDICARE

## 2025-02-10 VITALS
BODY MASS INDEX: 21.77 KG/M2 | WEIGHT: 108 LBS | SYSTOLIC BLOOD PRESSURE: 175 MMHG | HEART RATE: 80 BPM | DIASTOLIC BLOOD PRESSURE: 82 MMHG | OXYGEN SATURATION: 100 % | HEIGHT: 59 IN

## 2025-02-10 DIAGNOSIS — I87.2 VENOUS INSUFFICIENCY (CHRONIC) (PERIPHERAL): ICD-10-CM

## 2025-02-10 PROCEDURE — 99213 OFFICE O/P EST LOW 20 MIN: CPT

## 2025-06-04 ENCOUNTER — APPOINTMENT (OUTPATIENT)
Dept: DERMATOLOGY | Facility: CLINIC | Age: 80
End: 2025-06-04
Payer: MEDICARE

## 2025-06-04 PROCEDURE — D0051: CPT

## 2025-06-04 PROCEDURE — 99203 OFFICE O/P NEW LOW 30 MIN: CPT

## 2025-07-14 ENCOUNTER — APPOINTMENT (OUTPATIENT)
Dept: VASCULAR SURGERY | Facility: CLINIC | Age: 80
End: 2025-07-14
Payer: MEDICARE

## 2025-07-14 VITALS
HEIGHT: 59 IN | SYSTOLIC BLOOD PRESSURE: 176 MMHG | OXYGEN SATURATION: 100 % | BODY MASS INDEX: 21.77 KG/M2 | HEART RATE: 60 BPM | WEIGHT: 108 LBS | DIASTOLIC BLOOD PRESSURE: 76 MMHG

## 2025-07-14 PROCEDURE — 99203 OFFICE O/P NEW LOW 30 MIN: CPT

## 2025-07-23 NOTE — ED PROVIDER NOTE - EYES, MLM
Clear bilaterally, pupils equal, round and reactive to light.
0 = understands/communicates without difficulty
Male